# Patient Record
Sex: MALE | Race: WHITE | NOT HISPANIC OR LATINO | Employment: FULL TIME | URBAN - METROPOLITAN AREA
[De-identification: names, ages, dates, MRNs, and addresses within clinical notes are randomized per-mention and may not be internally consistent; named-entity substitution may affect disease eponyms.]

---

## 2017-02-06 ENCOUNTER — OFFICE VISIT (OUTPATIENT)
Dept: INTERNAL MEDICINE | Facility: CLINIC | Age: 37
End: 2017-02-06
Payer: COMMERCIAL

## 2017-02-06 VITALS
OXYGEN SATURATION: 99 % | TEMPERATURE: 98 F | DIASTOLIC BLOOD PRESSURE: 90 MMHG | HEIGHT: 76 IN | BODY MASS INDEX: 27.19 KG/M2 | SYSTOLIC BLOOD PRESSURE: 118 MMHG | HEART RATE: 70 BPM | WEIGHT: 223.31 LBS

## 2017-02-06 DIAGNOSIS — R25.3 FASCICULATIONS: ICD-10-CM

## 2017-02-06 DIAGNOSIS — B34.9 ACUTE VIRAL SYNDROME: Primary | ICD-10-CM

## 2017-02-06 PROCEDURE — 99213 OFFICE O/P EST LOW 20 MIN: CPT | Mod: S$GLB,,, | Performed by: INTERNAL MEDICINE

## 2017-02-06 PROCEDURE — 99999 PR PBB SHADOW E&M-EST. PATIENT-LVL III: CPT | Mod: PBBFAC,,, | Performed by: INTERNAL MEDICINE

## 2017-02-06 NOTE — MR AVS SNAPSHOT
"    [facility]  [address]  [facility] [address]  Phone: [phone]  Fax: [phone]                  Kurt Stoll   2017 1:20 PM   Office Visit    Description:  Male : 1980   Provider:  Deloris Christian MD   Department:  [facility]           Reason for Visit     Cough     Muscle Pain           Diagnoses this Visit        Comments    Acute viral syndrome    -  Primary            To Do List           Goals (5 Years of Data)     None      [facility]     [facility] On Call Nurse Care Line -  Assistance  Registered nurses in the [facility] On Call Center provide clinical advisement, health education, appointment booking, and other advisory services.  Call for this free service at [phone].             Medications           STOP taking these medications     propranolol (INDERAL) 10 MG tablet Take 1 tablet (10 mg total) by mouth 2 (two) times daily as needed.           Verify that the below list of medications is an accurate representation of the medications you are currently taking.  If none reported, the list may be blank. If incorrect, please contact your healthcare provider. Carry this list with you in case of emergency.           Current Medications            Clinical Reference Information           Your Vitals Were     BP Pulse Temp Height Weight SpO2    118/90 (BP Location: Right arm, Patient Position: Sitting, BP Method: Manual) 70 98 °F (36.7 °C) (Oral) 6' 4" (1.93 m) 101.3 kg (223 lb 5.2 oz) 99%    BMI                27.18 kg/m2          Blood Pressure          Most Recent Value    BP  (!)  118/90      Allergies as of 2017     No Known Allergies      Immunizations Administered on Date of Encounter - 2017     None      Instructions    Ibuprofen  600 mg 3 times a day with food.  Stay well hydrated.    Afrin nasal spray 3 days on, 3 days off.           Language Assistance Services     ATTENTION: Language assistance services are " available, free of charge. Please call [phone].      ATENCIÓN: Si habla español, tiene a lara disposición servicios gratuitos de asistencia lingüística. Llame al [phone].     CHÚ Ý: N?u b?n nói Ti?ng Vi?t, có các d?ch v? h? tr? ngôn ng? mi?n phí dành cho b?n. G?i s? [phone].         [facility] complies with applicable Federal civil rights laws and does not discriminate on the basis of race, color, national origin, age, disability, or sex.

## 2017-02-06 NOTE — PATIENT INSTRUCTIONS
Ibuprofen  600 mg 3 times a day with food.  Stay well hydrated.    Afrin nasal spray 3 days on, 3 days off.

## 2017-02-06 NOTE — PROGRESS NOTES
Subjective:       Patient ID: Kurt Stoll is a 36 y.o. male.    Chief Complaint: Cough (x 3 weeks yellow mucus ) and Muscle Pain (x 4 days 6/10)    HPI   C/o severe nasal congestion x 2 weeks, a new finding.   Mostly clear drainage. Lots of sneezing.  Eyes itchy initially.  Now throat sore.  Muscles super sore x 3-4 days.  No fever.  Very difficult to breathe at night.  Not much drip.  Mild cough.  No wheezing.  Also c/o lower lid twitching.  Yesterday has a rash over upper abdomen, now resolved.  Palate is sore; lump painful.   Mild diarrhea.  3 days ago had sharp groin pain, now better.  No dysuria.  No headache. Mental dullness.  Played Lacrosse yesterday despite having muscle pain.  No change in urine color..      Concerned b/c he has 2 small children and wife is about to deliver.  Review of Systems   Constitutional: Negative for activity change and unexpected weight change.   HENT: Positive for sneezing. Negative for sore throat and tinnitus.    Respiratory: Negative for chest tightness and shortness of breath.    Cardiovascular: Negative for chest pain and leg swelling.   Gastrointestinal: Negative for abdominal pain.   Musculoskeletal: Positive for myalgias.   Neurological: Negative for weakness and headaches.   Psychiatric/Behavioral: Negative for dysphoric mood.       Objective:      Physical Exam   Constitutional: He is oriented to person, place, and time. He appears well-developed and well-nourished.   HENT:   Right Ear: External ear normal.   Left Ear: External ear normal.   Mouth/Throat: No oropharyngeal exudate.   Tm's clear.  2 small papules on midline of proximal palate   Eyes: Right eye exhibits no discharge. Left eye exhibits no discharge.   Neck: Normal range of motion. Neck supple.   Cardiovascular: Normal rate and regular rhythm.    Pulmonary/Chest: Effort normal and breath sounds normal. No respiratory distress. He has no wheezes. He has no rales.   Musculoskeletal: He exhibits tenderness  (mild UE tenderness).   Lymphadenopathy:     He has no cervical adenopathy.   Neurological: He is alert and oriented to person, place, and time.   Skin:   Splotchy rash upper abd.   Psychiatric: He has a normal mood and affect. His behavior is normal.       Assessment:       1. Acute viral syndrome    2. Fasciculations of eyelid     3.    Likely preceding AR.         Plan:       Kurt was seen today for cough and muscle pain.    Diagnoses and all orders for this visit:    Acute viral syndrome    Fasciculations       See patient instructions      Call if any worsening.

## 2017-12-06 ENCOUNTER — OFFICE VISIT (OUTPATIENT)
Dept: INTERNAL MEDICINE | Facility: CLINIC | Age: 37
End: 2017-12-06
Attending: FAMILY MEDICINE
Payer: COMMERCIAL

## 2017-12-06 ENCOUNTER — LAB VISIT (OUTPATIENT)
Dept: LAB | Facility: HOSPITAL | Age: 37
End: 2017-12-06
Attending: FAMILY MEDICINE
Payer: COMMERCIAL

## 2017-12-06 VITALS
DIASTOLIC BLOOD PRESSURE: 82 MMHG | WEIGHT: 227.63 LBS | BODY MASS INDEX: 27.72 KG/M2 | SYSTOLIC BLOOD PRESSURE: 121 MMHG | HEIGHT: 76 IN

## 2017-12-06 DIAGNOSIS — R20.0 NUMBNESS AND TINGLING: ICD-10-CM

## 2017-12-06 DIAGNOSIS — R25.3 FASCICULATION: ICD-10-CM

## 2017-12-06 DIAGNOSIS — M79.673 PAIN OF FOOT, UNSPECIFIED LATERALITY: Primary | ICD-10-CM

## 2017-12-06 DIAGNOSIS — R20.2 NUMBNESS AND TINGLING: ICD-10-CM

## 2017-12-06 LAB
ANION GAP SERPL CALC-SCNC: 8 MMOL/L
BUN SERPL-MCNC: 17 MG/DL
CALCIUM SERPL-MCNC: 9.4 MG/DL
CHLORIDE SERPL-SCNC: 103 MMOL/L
CK SERPL-CCNC: 489 U/L
CO2 SERPL-SCNC: 27 MMOL/L
CREAT SERPL-MCNC: 1.3 MG/DL
ERYTHROCYTE [SEDIMENTATION RATE] IN BLOOD BY WESTERGREN METHOD: 2 MM/HR
EST. GFR  (AFRICAN AMERICAN): >60 ML/MIN/1.73 M^2
EST. GFR  (NON AFRICAN AMERICAN): >60 ML/MIN/1.73 M^2
GLUCOSE SERPL-MCNC: 95 MG/DL
POTASSIUM SERPL-SCNC: 4.1 MMOL/L
SODIUM SERPL-SCNC: 138 MMOL/L
TSH SERPL DL<=0.005 MIU/L-ACNC: 2.03 UIU/ML

## 2017-12-06 PROCEDURE — 99999 PR PBB SHADOW E&M-EST. PATIENT-LVL III: CPT | Mod: PBBFAC,,, | Performed by: FAMILY MEDICINE

## 2017-12-06 PROCEDURE — 82550 ASSAY OF CK (CPK): CPT

## 2017-12-06 PROCEDURE — 99214 OFFICE O/P EST MOD 30 MIN: CPT | Mod: S$GLB,,, | Performed by: FAMILY MEDICINE

## 2017-12-06 PROCEDURE — 36415 COLL VENOUS BLD VENIPUNCTURE: CPT

## 2017-12-06 PROCEDURE — 84443 ASSAY THYROID STIM HORMONE: CPT

## 2017-12-06 PROCEDURE — 80048 BASIC METABOLIC PNL TOTAL CA: CPT

## 2017-12-06 PROCEDURE — 85651 RBC SED RATE NONAUTOMATED: CPT

## 2017-12-06 NOTE — PROGRESS NOTES
Subjective:       Patient ID: Kurt Stoll is a 37 y.o. male.    Chief Complaint: Foot Pain    HPI  Review of Systems   Constitutional: Negative for activity change, chills, fatigue and fever.   HENT: Negative for congestion and trouble swallowing.    Eyes: Negative for discharge and redness.   Respiratory: Negative for cough, chest tightness, shortness of breath and wheezing.    Cardiovascular: Negative for chest pain, palpitations and leg swelling.   Gastrointestinal: Negative for abdominal pain, blood in stool, constipation, diarrhea and vomiting.   Genitourinary: Negative for difficulty urinating and hematuria.   Musculoskeletal: Positive for arthralgias. Negative for back pain, gait problem, joint swelling, myalgias and neck pain.   Skin: Negative for color change and rash.   Neurological: Positive for numbness. Negative for tremors, speech difficulty, weakness and headaches.   Hematological: Negative for adenopathy. Does not bruise/bleed easily.   Psychiatric/Behavioral: Negative for behavioral problems, confusion, dysphoric mood and sleep disturbance. The patient is not nervous/anxious.        Objective:      Physical Exam   Constitutional: He is oriented to person, place, and time. He appears well-developed and well-nourished. No distress.   Eyes: No scleral icterus.   Neck: Normal range of motion. Neck supple. Carotid bruit is not present.   Cardiovascular: Normal rate, regular rhythm, normal heart sounds and intact distal pulses.  Exam reveals no gallop and no friction rub.    No murmur heard.  Pulmonary/Chest: Effort normal and breath sounds normal. No respiratory distress. He has no wheezes. He has no rales.   Abdominal: Soft. Bowel sounds are normal. He exhibits no distension and no mass. There is no tenderness. There is no guarding.   Musculoskeletal: He exhibits no edema.        Right lower leg: He exhibits no edema.        Left lower leg: He exhibits no edema.   Neurological: He is alert and  oriented to person, place, and time. He displays no tremor. No cranial nerve deficit. Coordination and gait normal.   Skin: Skin is warm and dry. No rash noted. He is not diaphoretic. No erythema.   Psychiatric: He has a normal mood and affect. His behavior is normal. Judgment and thought content normal.   Nursing note and vitals reviewed.      Assessment:       1. Pain of foot, unspecified laterality    2. Numbness and tingling    3. Fasciculation        Plan:   Kurt was seen today for foot pain.    Diagnoses and all orders for this visit:    Pain of foot, unspecified laterality    Numbness and tingling    Fasciculation  -     Ambulatory referral to Neurology  -     TSH; Future  -     Basic metabolic panel; Future  -     CK; Future  -     Sedimentation rate, manual; Future      See meds, orders, follow up, routing and instructions sections of encounter.  A 37-year-old established male patient.  He is in for foot pain in multiple   locations, migratory.  We did discuss running shoes.  He uses primitive type   foot wear and has transitioned to that running technique over the past five   years or so.  He runs three miles, three times weekly.  He also describes a   dysesthesia in the first interspace on the left, not described as a distinct   numbness more of sensation of something being between the toes.    I looked back at his old record.  He had complained of some fasciculations in   the past.  He still has those, although the myalgia or cramping has improved.    He had a mildly elevated CK level in 2016, has not followed up or had   significant complaints since then.    ADDENDUM TO PHYSICAL EXAMINATION:  His foot examination is essentially normal.    He may have had some dysesthesia to pinprick in the first interspace to the left   foot.  His foot architecture was generally normal and did not notice any issues   with range of motion, redness, callus formation, etc.  No vascular issues.    RECOMMENDATIONS:  Foot  stretching, calf stretching, trial of the metatarsal   wafer.    See additional laboratory and orders and Neurology consult concerning a   persistent fasciculation.      LASHAWN/HN  dd: 12/06/2017 16:12:26 (CST)  td: 12/07/2017 10:42:28 (CST)  Doc ID   #[id number]  Job ID #[id number]    CC:

## 2017-12-07 ENCOUNTER — PATIENT MESSAGE (OUTPATIENT)
Dept: INTERNAL MEDICINE | Facility: CLINIC | Age: 37
End: 2017-12-07

## 2017-12-11 ENCOUNTER — OFFICE VISIT (OUTPATIENT)
Dept: NEUROLOGY | Facility: CLINIC | Age: 37
End: 2017-12-11
Payer: COMMERCIAL

## 2017-12-11 VITALS
DIASTOLIC BLOOD PRESSURE: 90 MMHG | BODY MASS INDEX: 28.08 KG/M2 | HEIGHT: 76 IN | SYSTOLIC BLOOD PRESSURE: 137 MMHG | HEART RATE: 55 BPM | WEIGHT: 230.63 LBS

## 2017-12-11 DIAGNOSIS — R25.3 MUSCLE TWITCH: Primary | ICD-10-CM

## 2017-12-11 DIAGNOSIS — R74.8 ELEVATED CPK: ICD-10-CM

## 2017-12-11 PROCEDURE — 99205 OFFICE O/P NEW HI 60 MIN: CPT | Mod: S$GLB,,, | Performed by: NEUROMUSCULOSKELETAL MEDICINE & OMM

## 2017-12-11 PROCEDURE — 99999 PR PBB SHADOW E&M-EST. PATIENT-LVL II: CPT | Mod: PBBFAC,,, | Performed by: NEUROMUSCULOSKELETAL MEDICINE & OMM

## 2017-12-12 PROBLEM — R25.3 MUSCLE TWITCH: Status: ACTIVE | Noted: 2017-12-12

## 2017-12-12 PROBLEM — R74.8 ELEVATED CPK: Status: ACTIVE | Noted: 2017-12-12

## 2017-12-12 NOTE — PROGRESS NOTES
Kurt HIPOLITO Jerman  1980  Review of patient's allergies indicates:  No Known Allergies  [unfilled]    No past medical history on file.  Social History     Social History    Marital status:      Spouse name: N/A    Number of children: 1.5    Years of education: N/A     Occupational History     [employer]     Social History Main Topics    Smoking status: Former Smoker     Quit date: 12/5/2009    Smokeless tobacco: Not on file    Alcohol use Yes      Comment: 17 drinks/weekend:  wine and liquor.    Drug use: Unknown    Sexual activity: Not on file     Other Topics Concern    Not on file     Social History Narrative    No narrative on file     Family History   Problem Relation Age of Onset    Aneurysm Mother     Heart disease Paternal Grandfather        Review of systems:  Constitutional-negative  Eyes-negative  ENT, mouth-negative  Cardiovascular-negative  Respiratory-negative  GI-negative  - negative  Musculoskeletal-negative  Skin-negative  Neurologic-negative  Psychiatric-negative  Endocrine-negative  Hematology/lymph nodes-negative  Allergies/immunology-negative  Baileeblanche MCMILLAN Jerman  1980  Review of patient's allergies indicates:  No Known Allergies  [unfilled]    No past medical history on file.  Social History     Social History    Marital status:      Spouse name: N/A    Number of children: 1.5    Years of education: N/A     Occupational History     [employer]     Social History Main Topics    Smoking status: Former Smoker     Quit date: 12/5/2009    Smokeless tobacco: Not on file    Alcohol use Yes      Comment: 17 drinks/weekend:  wine and liquor.    Drug use: Unknown    Sexual activity: Not on file     Other Topics Concern    Not on file     Social History Narrative    No narrative on file     Family History   Problem Relation Age of Onset    Aneurysm  Mother     Heart disease Paternal Grandfather        Review of systems:  Constitutional-negative  Eyes-negative  ENT, mouth-negative  Cardiovascular-negative  Respiratory-negative  GI-negative  - negative  Musculoskeletal-negative  Skin-negative  Neurologic-negative  Psychiatric-negative  Endocrine-negative  Hematology/lymph nodes-negative  Allergies/immunology-negative  Gen. Appearance: Well-developed with no obvious deformities  Carotid arteries symmetrical pulses  Peripheral vascular shows symmetrical pulses with no obvious edema or tenderness  Social History : Patient is a   Present history:   This is a 37-year-old white male who presents with complaints of muscle twitching over the last 6-7 years.  He has never seen a doctor for that problem until recently it became more prominent in the calf muscles in the feet and occasionally into the arms.  It now seems to occur all day long often on.  His primary care referred him to neurology since his CPKs have been running in the 500s.  Patient exercises regularly and typically runs 3 miles 3 times per week in addition to doing exercises on a regular basis.  He does do some weight lifting but low weights and frequent repetitions.  He denies any double vision, swallowing problems, breathing problems, or other focal neurologic symptoms.  He denies any specific weakness.  He does get some numbness and tingling in the feet on occasion.    Neurological Exam:  Mental status-alert and oriented to person, place, and time; attention span and concentration is good. Fund of knowledge-patient is aware of current events and able to give detailed history of the current problem.recent and remote memory seems intact. Language function is normal with no evidence of aphasia  Cranial nerves:Visual acuity to hand chart -normal; visual fields to confrontation normal;pupils were equal and reactive to light ;no evidence of ptosis ;  funduscopic examination was normal with  sharp disc margins. external ocular movements were full with no nystagmus. Facial sensation to pinprick : normal ; corneal reflexes intact; Facial muscles were symmetrical. Hearing is unimpaired symmetrical finger rub; Tongue movements - normal ; palate movements - normal ;Swallowing unimpaired. Shoulder shrug was intact with good strength Speech was normal  Motor examination: Upper : normal                                      Lower extremities - Normal;muscle tone was normal ;                  Right-handed  Sensory examination:   Upper; normal pinprick and soft touch ;   Lower extremities - normal and symmetrical.   Vibration sense: Right: 15-20 seconds/Left: less than 10 seconds @ toes  Deep tendon reflexes: upper extremities :1-2+ symmetrical ;     lower extremities KJ- to-3 +; AJ - 2+ left positive Babinski  Cerebellar examination upper: Normal finger to nose and rapid alternating movements  Gait: Steady with no ataxia;      heel and toe walk normal  Romberg test: negative       Tandem gait: Normal    Involuntary movements: Negative  TMJ - no tenderness  Cervical examination: Full range of motion with no pain Cervical tenderness :negative  Lumbar examination: Low back tenderness-negative                  Sciatic notchtenderness-negative            Straight leg raising : negative    Impression: Muscle twitching; mildly elevated CPK; left positive Babinskis; left impaired vibratory sensation at the toes    Recommendations/Plan : Long discussion with the patient in reference to differential diagnosis of muscle twitching with an abnormal neurologic exam.  We discussed the unlikelihood of ALS 7 year history of sensory involvement.  Demyelinating disease would definitely be in the differential however need to rule out neuromuscular disease in the presence of the muscle twitching and elevated CPK.  EMG nerve conduction study lower extremities

## 2017-12-13 ENCOUNTER — TELEPHONE (OUTPATIENT)
Dept: NEUROLOGY | Facility: CLINIC | Age: 37
End: 2017-12-13

## 2017-12-13 NOTE — TELEPHONE ENCOUNTER
----- Message from Hardik ERWIN Jayjay sent at 12/13/2017  9:10 AM CST -----  Contact: Self @ [phone]  Pt is asking to speak w/ the nurse to reschedule EMG on 12/15 @ 11:20 AM to 8:40 Am. Pls call.      I have spoken with this patient and was able to schedule his appointment at the time he has requested

## 2017-12-15 ENCOUNTER — PROCEDURE VISIT (OUTPATIENT)
Dept: NEUROLOGY | Facility: CLINIC | Age: 37
End: 2017-12-15
Payer: COMMERCIAL

## 2017-12-15 DIAGNOSIS — R25.3 MUSCLE TWITCH: ICD-10-CM

## 2017-12-15 PROCEDURE — 95909 NRV CNDJ TST 5-6 STUDIES: CPT | Mod: S$GLB,,, | Performed by: NEUROMUSCULOSKELETAL MEDICINE & OMM

## 2017-12-15 PROCEDURE — 95886 MUSC TEST DONE W/N TEST COMP: CPT | Mod: S$GLB,,, | Performed by: NEUROMUSCULOSKELETAL MEDICINE & OMM

## 2018-10-25 ENCOUNTER — PATIENT MESSAGE (OUTPATIENT)
Dept: INTERNAL MEDICINE | Facility: CLINIC | Age: 38
End: 2018-10-25

## 2020-09-17 ENCOUNTER — LAB VISIT (OUTPATIENT)
Dept: LAB | Facility: HOSPITAL | Age: 40
End: 2020-09-17
Attending: FAMILY MEDICINE
Payer: COMMERCIAL

## 2020-09-17 ENCOUNTER — IMMUNIZATION (OUTPATIENT)
Dept: PHARMACY | Facility: CLINIC | Age: 40
End: 2020-09-17

## 2020-09-17 ENCOUNTER — IMMUNIZATION (OUTPATIENT)
Dept: PHARMACY | Facility: CLINIC | Age: 40
End: 2020-09-17
Payer: COMMERCIAL

## 2020-09-17 ENCOUNTER — OFFICE VISIT (OUTPATIENT)
Dept: INTERNAL MEDICINE | Facility: CLINIC | Age: 40
End: 2020-09-17
Attending: FAMILY MEDICINE
Payer: COMMERCIAL

## 2020-09-17 VITALS
SYSTOLIC BLOOD PRESSURE: 112 MMHG | WEIGHT: 229.25 LBS | HEART RATE: 68 BPM | HEIGHT: 76 IN | OXYGEN SATURATION: 99 % | BODY MASS INDEX: 27.92 KG/M2 | DIASTOLIC BLOOD PRESSURE: 82 MMHG

## 2020-09-17 DIAGNOSIS — R74.8 ELEVATED CPK: ICD-10-CM

## 2020-09-17 DIAGNOSIS — R25.3 MUSCLE TWITCH: ICD-10-CM

## 2020-09-17 DIAGNOSIS — Z00.00 ANNUAL PHYSICAL EXAM: Primary | ICD-10-CM

## 2020-09-17 DIAGNOSIS — Z00.00 ANNUAL PHYSICAL EXAM: ICD-10-CM

## 2020-09-17 LAB
ALBUMIN SERPL BCP-MCNC: 4.4 G/DL (ref 3.5–5.2)
ALP SERPL-CCNC: 41 U/L (ref 55–135)
ALT SERPL W/O P-5'-P-CCNC: 23 U/L (ref 10–44)
ANION GAP SERPL CALC-SCNC: 9 MMOL/L (ref 8–16)
AST SERPL-CCNC: 35 U/L (ref 10–40)
BILIRUB SERPL-MCNC: 0.5 MG/DL (ref 0.1–1)
BUN SERPL-MCNC: 12 MG/DL (ref 6–20)
CALCIUM SERPL-MCNC: 9.2 MG/DL (ref 8.7–10.5)
CHLORIDE SERPL-SCNC: 104 MMOL/L (ref 95–110)
CHOLEST SERPL-MCNC: 149 MG/DL (ref 120–199)
CHOLEST/HDLC SERPL: 2.7 {RATIO} (ref 2–5)
CK SERPL-CCNC: 416 U/L (ref 20–200)
CO2 SERPL-SCNC: 27 MMOL/L (ref 23–29)
CREAT SERPL-MCNC: 1.2 MG/DL (ref 0.5–1.4)
EST. GFR  (AFRICAN AMERICAN): >60 ML/MIN/1.73 M^2
EST. GFR  (NON AFRICAN AMERICAN): >60 ML/MIN/1.73 M^2
GLUCOSE SERPL-MCNC: 93 MG/DL (ref 70–110)
HDLC SERPL-MCNC: 56 MG/DL (ref 40–75)
HDLC SERPL: 37.6 % (ref 20–50)
LDLC SERPL CALC-MCNC: 79.2 MG/DL (ref 63–159)
NONHDLC SERPL-MCNC: 93 MG/DL
POTASSIUM SERPL-SCNC: 3.9 MMOL/L (ref 3.5–5.1)
PROT SERPL-MCNC: 7.3 G/DL (ref 6–8.4)
SARS-COV-2 IGG SERPLBLD QL IA.RAPID: NEGATIVE
SODIUM SERPL-SCNC: 140 MMOL/L (ref 136–145)
TRIGL SERPL-MCNC: 69 MG/DL (ref 30–150)

## 2020-09-17 PROCEDURE — 86769 SARS-COV-2 COVID-19 ANTIBODY: CPT

## 2020-09-17 PROCEDURE — 82550 ASSAY OF CK (CPK): CPT

## 2020-09-17 PROCEDURE — 36415 COLL VENOUS BLD VENIPUNCTURE: CPT

## 2020-09-17 PROCEDURE — 99999 PR PBB SHADOW E&M-EST. PATIENT-LVL III: ICD-10-PCS | Mod: PBBFAC,,, | Performed by: FAMILY MEDICINE

## 2020-09-17 PROCEDURE — 99999 PR PBB SHADOW E&M-EST. PATIENT-LVL III: CPT | Mod: PBBFAC,,, | Performed by: FAMILY MEDICINE

## 2020-09-17 PROCEDURE — 80061 LIPID PANEL: CPT

## 2020-09-17 PROCEDURE — 80053 COMPREHEN METABOLIC PANEL: CPT

## 2020-09-17 PROCEDURE — 99395 PREV VISIT EST AGE 18-39: CPT | Mod: S$GLB,,, | Performed by: FAMILY MEDICINE

## 2020-09-17 PROCEDURE — 99395 PR PREVENTIVE VISIT,EST,18-39: ICD-10-PCS | Mod: S$GLB,,, | Performed by: FAMILY MEDICINE

## 2020-09-17 NOTE — PATIENT INSTRUCTIONS
Flu shot today    TDAP vaccine today    Schedule lab orders for today.     nformation about cholesterol, high blood pressure and healthy diet and activity recommendations can be found at the following links on the Internet:    http://www.nhlbi.nih.gov/health/health-topics/topics/hbc  http://www.nhlbi.nih.gov/health/educational/lose_wt/index.htm  Http://www.nhlbi.nih.gov/files/docs/public/heart/hbp_low.pdf  http://www.heart.org/HEARTORG/  http://diabetes.org/  https://www.cdc.gov/  Https://healthfinder.gov/  https://health.gov/dietaryguidelines/2015/guidelines/  https://health.gov/paguidelines/second-edition/pdf/Physical_Activity_Guidelines_2nd_edition.pdf

## 2020-09-17 NOTE — PROGRESS NOTES
Subjective:       Patient ID: Kurt Stoll is a 39 y.o. male.    Chief Complaint: Annual Exam    Established patient for an annual wellness check/physical exam and also chronic disease management. Specific complaints - see dictation and please see ROS.  P, S, Fm, Soc Hx's; Meds, allergies reviewed and reconciled.  Health maintenance file reviewed and addressed items due.    No progression of twitching. No aches and no weakness.    Review of Systems   Constitutional: Negative for activity change, chills, fatigue, fever and unexpected weight change.   HENT: Negative for congestion, hearing loss, rhinorrhea and trouble swallowing.    Eyes: Negative for discharge, redness and visual disturbance.   Respiratory: Negative for cough, chest tightness, shortness of breath and wheezing.    Cardiovascular: Negative for chest pain, palpitations and leg swelling.   Gastrointestinal: Negative for abdominal pain, blood in stool, constipation, diarrhea and vomiting.   Endocrine: Negative for polydipsia and polyuria.   Genitourinary: Negative for difficulty urinating, hematuria and urgency.   Musculoskeletal: Negative for arthralgias, back pain, gait problem, joint swelling, myalgias and neck pain.   Skin: Negative for color change and rash.   Neurological: Negative for tremors, speech difficulty, weakness, numbness and headaches.   Hematological: Negative for adenopathy. Does not bruise/bleed easily.   Psychiatric/Behavioral: Negative for behavioral problems, confusion, dysphoric mood and sleep disturbance. The patient is not nervous/anxious.        Objective:      Physical Exam  Vitals signs and nursing note reviewed.   Constitutional:       Appearance: He is well-developed. He is not diaphoretic.   Eyes:      General: No scleral icterus.  Neck:      Musculoskeletal: Normal range of motion and neck supple.      Thyroid: No thyromegaly.      Vascular: No carotid bruit or JVD.      Trachea: No tracheal deviation.   Cardiovascular:       Rate and Rhythm: Normal rate and regular rhythm.      Heart sounds: Normal heart sounds. No murmur. No friction rub. No gallop.    Pulmonary:      Effort: Pulmonary effort is normal. No respiratory distress.      Breath sounds: Normal breath sounds. No wheezing or rales.   Abdominal:      General: There is no distension.      Palpations: Abdomen is soft. There is no mass.      Tenderness: There is no abdominal tenderness. There is no guarding or rebound.   Lymphadenopathy:      Cervical: No cervical adenopathy.   Skin:     General: Skin is warm and dry.      Findings: No erythema or rash.   Neurological:      Mental Status: He is alert and oriented to person, place, and time.      Cranial Nerves: No cranial nerve deficit.      Motor: No tremor.      Coordination: Coordination normal.      Gait: Gait normal.   Psychiatric:         Behavior: Behavior normal.         Thought Content: Thought content normal.         Judgment: Judgment normal.         Assessment:       1. Annual physical exam    2. Muscle twitch    3. Elevated CPK        Plan:     Kurt was seen today for annual exam.    Diagnoses and all orders for this visit:    Annual physical exam  -     Comprehensive metabolic panel; Future  -     Lipid Panel; Future  -     CK; Future    Muscle twitch  Comments:  w/u by neurology in 2017, no dx, no progression, does not want to pursue    Elevated CPK  Comments:  w/u by neurology in 2017, no dx, no progression, does not want to pursue. does lift weights.  Orders:  -     CK; Future    Other orders  -     COVID-19 (SARS CoV-2) IgG Antibody; Future      See meds, orders, follow up, routing and instructions sections of encounter and AVS. Discussed with patient and provided on AVS.

## 2020-11-03 ENCOUNTER — PATIENT MESSAGE (OUTPATIENT)
Dept: INTERNAL MEDICINE | Facility: CLINIC | Age: 40
End: 2020-11-03

## 2020-11-10 NOTE — TELEPHONE ENCOUNTER
From:Richi Fernandes       Sent:11/3/2020  4:56 PM CST         To:Kurt Stoll    Subject:RE: Test Results    .tlb      ----- Message -----       From:Kurt Stoll       Sent:11/3/2020  4:25 PM CST         To:Harshal Love MD    Subject:Test Results    Dr. Love,    I was reading online that there is a test that can determine what part of the body is responsible for elevated CPK results. It seems like you can tell if it is coming from you brain, your muscles, or your heart. Maybe the test I took can already do this?    Could you look into this a little and see if there is any value to trying to determine the source? I am a little worried because my mother  of an anurism and I do have family heart disease issues. I would feel more comfortable knowing where the problem is arising from, but I also don't know if what I was reading was legit. I think it was from a reputable source like the [facility] or something, but I don't remember offhand.    Thanks,  Kurt Stoll

## 2021-08-25 ENCOUNTER — OFFICE VISIT (OUTPATIENT)
Dept: INTERNAL MEDICINE | Facility: CLINIC | Age: 41
End: 2021-08-25
Payer: COMMERCIAL

## 2021-08-25 ENCOUNTER — LAB VISIT (OUTPATIENT)
Dept: LAB | Facility: HOSPITAL | Age: 41
End: 2021-08-25
Payer: COMMERCIAL

## 2021-08-25 VITALS
HEART RATE: 64 BPM | SYSTOLIC BLOOD PRESSURE: 112 MMHG | HEIGHT: 76 IN | WEIGHT: 234.13 LBS | DIASTOLIC BLOOD PRESSURE: 84 MMHG | BODY MASS INDEX: 28.51 KG/M2 | OXYGEN SATURATION: 98 %

## 2021-08-25 DIAGNOSIS — R74.8 ELEVATED CK: ICD-10-CM

## 2021-08-25 DIAGNOSIS — Z00.00 ENCOUNTER FOR HEALTH MAINTENANCE EXAMINATION IN ADULT: Primary | ICD-10-CM

## 2021-08-25 DIAGNOSIS — Z01.89 ROUTINE LAB DRAW: ICD-10-CM

## 2021-08-25 DIAGNOSIS — R25.2 MUSCLE CRAMPS: ICD-10-CM

## 2021-08-25 DIAGNOSIS — Z00.00 ENCOUNTER FOR HEALTH MAINTENANCE EXAMINATION IN ADULT: ICD-10-CM

## 2021-08-25 LAB
ALBUMIN SERPL BCP-MCNC: 4.3 G/DL (ref 3.5–5.2)
ALP SERPL-CCNC: 42 U/L (ref 55–135)
ALT SERPL W/O P-5'-P-CCNC: 33 U/L (ref 10–44)
ANION GAP SERPL CALC-SCNC: 11 MMOL/L (ref 8–16)
AST SERPL-CCNC: 40 U/L (ref 10–40)
BASOPHILS # BLD AUTO: 0.04 K/UL (ref 0–0.2)
BASOPHILS NFR BLD: 0.8 % (ref 0–1.9)
BILIRUB SERPL-MCNC: 0.6 MG/DL (ref 0.1–1)
BUN SERPL-MCNC: 15 MG/DL (ref 6–20)
CALCIUM SERPL-MCNC: 10.1 MG/DL (ref 8.7–10.5)
CHLORIDE SERPL-SCNC: 101 MMOL/L (ref 95–110)
CHOLEST SERPL-MCNC: 154 MG/DL (ref 120–199)
CHOLEST/HDLC SERPL: 2.8 {RATIO} (ref 2–5)
CK SERPL-CCNC: 487 U/L (ref 20–200)
CO2 SERPL-SCNC: 26 MMOL/L (ref 23–29)
CREAT SERPL-MCNC: 1.2 MG/DL (ref 0.5–1.4)
DIFFERENTIAL METHOD: ABNORMAL
EOSINOPHIL # BLD AUTO: 0.2 K/UL (ref 0–0.5)
EOSINOPHIL NFR BLD: 3.2 % (ref 0–8)
ERYTHROCYTE [DISTWIDTH] IN BLOOD BY AUTOMATED COUNT: 12.2 % (ref 11.5–14.5)
EST. GFR  (AFRICAN AMERICAN): >60 ML/MIN/1.73 M^2
EST. GFR  (NON AFRICAN AMERICAN): >60 ML/MIN/1.73 M^2
GLUCOSE SERPL-MCNC: 94 MG/DL (ref 70–110)
HCT VFR BLD AUTO: 43.7 % (ref 40–54)
HDLC SERPL-MCNC: 55 MG/DL (ref 40–75)
HDLC SERPL: 35.7 % (ref 20–50)
HGB BLD-MCNC: 15.1 G/DL (ref 14–18)
IMM GRANULOCYTES # BLD AUTO: 0.01 K/UL (ref 0–0.04)
IMM GRANULOCYTES NFR BLD AUTO: 0.2 % (ref 0–0.5)
LDLC SERPL CALC-MCNC: 82.8 MG/DL (ref 63–159)
LYMPHOCYTES # BLD AUTO: 1.5 K/UL (ref 1–4.8)
LYMPHOCYTES NFR BLD: 31.6 % (ref 18–48)
MCH RBC QN AUTO: 31.9 PG (ref 27–31)
MCHC RBC AUTO-ENTMCNC: 34.6 G/DL (ref 32–36)
MCV RBC AUTO: 92 FL (ref 82–98)
MONOCYTES # BLD AUTO: 0.6 K/UL (ref 0.3–1)
MONOCYTES NFR BLD: 11.8 % (ref 4–15)
NEUTROPHILS # BLD AUTO: 2.5 K/UL (ref 1.8–7.7)
NEUTROPHILS NFR BLD: 52.4 % (ref 38–73)
NONHDLC SERPL-MCNC: 99 MG/DL
NRBC BLD-RTO: 0 /100 WBC
PLATELET # BLD AUTO: 228 K/UL (ref 150–450)
PMV BLD AUTO: 11 FL (ref 9.2–12.9)
POTASSIUM SERPL-SCNC: 4.1 MMOL/L (ref 3.5–5.1)
PROT SERPL-MCNC: 7.3 G/DL (ref 6–8.4)
RBC # BLD AUTO: 4.74 M/UL (ref 4.6–6.2)
SODIUM SERPL-SCNC: 138 MMOL/L (ref 136–145)
TRIGL SERPL-MCNC: 81 MG/DL (ref 30–150)
TSH SERPL DL<=0.005 MIU/L-ACNC: 2.44 UIU/ML (ref 0.4–4)
WBC # BLD AUTO: 4.74 K/UL (ref 3.9–12.7)

## 2021-08-25 PROCEDURE — 1159F MED LIST DOCD IN RCRD: CPT | Mod: CPTII,S$GLB,, | Performed by: NURSE PRACTITIONER

## 2021-08-25 PROCEDURE — 3079F DIAST BP 80-89 MM HG: CPT | Mod: CPTII,S$GLB,, | Performed by: NURSE PRACTITIONER

## 2021-08-25 PROCEDURE — 85025 COMPLETE CBC W/AUTO DIFF WBC: CPT | Performed by: NURSE PRACTITIONER

## 2021-08-25 PROCEDURE — 99395 PREV VISIT EST AGE 18-39: CPT | Mod: S$GLB,,, | Performed by: NURSE PRACTITIONER

## 2021-08-25 PROCEDURE — 3079F PR MOST RECENT DIASTOLIC BLOOD PRESSURE 80-89 MM HG: ICD-10-PCS | Mod: CPTII,S$GLB,, | Performed by: NURSE PRACTITIONER

## 2021-08-25 PROCEDURE — 1160F RVW MEDS BY RX/DR IN RCRD: CPT | Mod: CPTII,S$GLB,, | Performed by: NURSE PRACTITIONER

## 2021-08-25 PROCEDURE — 1126F AMNT PAIN NOTED NONE PRSNT: CPT | Mod: CPTII,S$GLB,, | Performed by: NURSE PRACTITIONER

## 2021-08-25 PROCEDURE — 1160F PR REVIEW ALL MEDS BY PRESCRIBER/CLIN PHARMACIST DOCUMENTED: ICD-10-PCS | Mod: CPTII,S$GLB,, | Performed by: NURSE PRACTITIONER

## 2021-08-25 PROCEDURE — 80053 COMPREHEN METABOLIC PANEL: CPT | Performed by: NURSE PRACTITIONER

## 2021-08-25 PROCEDURE — 82550 ASSAY OF CK (CPK): CPT | Performed by: NURSE PRACTITIONER

## 2021-08-25 PROCEDURE — 99999 PR PBB SHADOW E&M-EST. PATIENT-LVL III: ICD-10-PCS | Mod: PBBFAC,,, | Performed by: NURSE PRACTITIONER

## 2021-08-25 PROCEDURE — 99395 PR PREVENTIVE VISIT,EST,18-39: ICD-10-PCS | Mod: S$GLB,,, | Performed by: NURSE PRACTITIONER

## 2021-08-25 PROCEDURE — 3074F PR MOST RECENT SYSTOLIC BLOOD PRESSURE < 130 MM HG: ICD-10-PCS | Mod: CPTII,S$GLB,, | Performed by: NURSE PRACTITIONER

## 2021-08-25 PROCEDURE — 99999 PR PBB SHADOW E&M-EST. PATIENT-LVL III: CPT | Mod: PBBFAC,,, | Performed by: NURSE PRACTITIONER

## 2021-08-25 PROCEDURE — 3008F PR BODY MASS INDEX (BMI) DOCUMENTED: ICD-10-PCS | Mod: CPTII,S$GLB,, | Performed by: NURSE PRACTITIONER

## 2021-08-25 PROCEDURE — 1126F PR PAIN SEVERITY QUANTIFIED, NO PAIN PRESENT: ICD-10-PCS | Mod: CPTII,S$GLB,, | Performed by: NURSE PRACTITIONER

## 2021-08-25 PROCEDURE — 3008F BODY MASS INDEX DOCD: CPT | Mod: CPTII,S$GLB,, | Performed by: NURSE PRACTITIONER

## 2021-08-25 PROCEDURE — 80061 LIPID PANEL: CPT | Performed by: NURSE PRACTITIONER

## 2021-08-25 PROCEDURE — 36415 COLL VENOUS BLD VENIPUNCTURE: CPT | Performed by: NURSE PRACTITIONER

## 2021-08-25 PROCEDURE — 3074F SYST BP LT 130 MM HG: CPT | Mod: CPTII,S$GLB,, | Performed by: NURSE PRACTITIONER

## 2021-08-25 PROCEDURE — 1159F PR MEDICATION LIST DOCUMENTED IN MEDICAL RECORD: ICD-10-PCS | Mod: CPTII,S$GLB,, | Performed by: NURSE PRACTITIONER

## 2021-08-25 PROCEDURE — 84443 ASSAY THYROID STIM HORMONE: CPT | Performed by: NURSE PRACTITIONER

## 2021-08-26 ENCOUNTER — TELEPHONE (OUTPATIENT)
Dept: INTERNAL MEDICINE | Facility: CLINIC | Age: 41
End: 2021-08-26

## 2021-08-26 DIAGNOSIS — R74.8 ELEVATED CPK: ICD-10-CM

## 2021-08-26 DIAGNOSIS — M62.838 MUSCLE SPASMS OF BOTH LOWER EXTREMITIES: ICD-10-CM

## 2021-09-10 ENCOUNTER — TELEPHONE (OUTPATIENT)
Dept: NEUROLOGY | Facility: CLINIC | Age: 41
End: 2021-09-10

## 2022-02-03 ENCOUNTER — PATIENT OUTREACH (OUTPATIENT)
Dept: ADMINISTRATIVE | Facility: OTHER | Age: 42
End: 2022-02-03
Payer: COMMERCIAL

## 2022-02-03 NOTE — PROGRESS NOTES
Health Maintenance Due   Topic Date Due    Hepatitis C Screening  Never done    HIV Screening  Never done    Influenza Vaccine (1) 09/01/2021    COVID-19 Vaccine (3 - Booster for Moderna series) 10/14/2021     Updates were requested from care everywhere.  Chart was reviewed for overdue Proactive [facility] Encounters ([facility]) topics (CRS, Breast Cancer Screening, Eye exam)  Health Maintenance has been updated.  LINKS immunization registry triggered.  Immunizations were reconciled.

## 2022-02-07 ENCOUNTER — OFFICE VISIT (OUTPATIENT)
Dept: NEUROLOGY | Facility: CLINIC | Age: 42
End: 2022-02-07
Payer: COMMERCIAL

## 2022-02-07 VITALS
BODY MASS INDEX: 28.51 KG/M2 | HEIGHT: 76 IN | WEIGHT: 234.13 LBS | HEART RATE: 61 BPM | SYSTOLIC BLOOD PRESSURE: 140 MMHG | DIASTOLIC BLOOD PRESSURE: 92 MMHG

## 2022-02-07 DIAGNOSIS — R74.8 ELEVATED CPK: ICD-10-CM

## 2022-02-07 DIAGNOSIS — R25.3 MUSCLE TWITCH: Primary | ICD-10-CM

## 2022-02-07 DIAGNOSIS — R53.1 WEAKNESS: ICD-10-CM

## 2022-02-07 PROCEDURE — 3008F BODY MASS INDEX DOCD: CPT | Mod: CPTII,S$GLB,, | Performed by: PSYCHIATRY & NEUROLOGY

## 2022-02-07 PROCEDURE — 1160F PR REVIEW ALL MEDS BY PRESCRIBER/CLIN PHARMACIST DOCUMENTED: ICD-10-PCS | Mod: CPTII,S$GLB,, | Performed by: PSYCHIATRY & NEUROLOGY

## 2022-02-07 PROCEDURE — 99999 PR PBB SHADOW E&M-EST. PATIENT-LVL III: CPT | Mod: PBBFAC,,, | Performed by: PSYCHIATRY & NEUROLOGY

## 2022-02-07 PROCEDURE — 99205 PR OFFICE/OUTPT VISIT, NEW, LEVL V, 60-74 MIN: ICD-10-PCS | Mod: S$GLB,,, | Performed by: PSYCHIATRY & NEUROLOGY

## 2022-02-07 PROCEDURE — 1160F RVW MEDS BY RX/DR IN RCRD: CPT | Mod: CPTII,S$GLB,, | Performed by: PSYCHIATRY & NEUROLOGY

## 2022-02-07 PROCEDURE — 3008F PR BODY MASS INDEX (BMI) DOCUMENTED: ICD-10-PCS | Mod: CPTII,S$GLB,, | Performed by: PSYCHIATRY & NEUROLOGY

## 2022-02-07 PROCEDURE — 3080F DIAST BP >= 90 MM HG: CPT | Mod: CPTII,S$GLB,, | Performed by: PSYCHIATRY & NEUROLOGY

## 2022-02-07 PROCEDURE — 99205 OFFICE O/P NEW HI 60 MIN: CPT | Mod: S$GLB,,, | Performed by: PSYCHIATRY & NEUROLOGY

## 2022-02-07 PROCEDURE — 3080F PR MOST RECENT DIASTOLIC BLOOD PRESSURE >= 90 MM HG: ICD-10-PCS | Mod: CPTII,S$GLB,, | Performed by: PSYCHIATRY & NEUROLOGY

## 2022-02-07 PROCEDURE — 1159F MED LIST DOCD IN RCRD: CPT | Mod: CPTII,S$GLB,, | Performed by: PSYCHIATRY & NEUROLOGY

## 2022-02-07 PROCEDURE — 3077F PR MOST RECENT SYSTOLIC BLOOD PRESSURE >= 140 MM HG: ICD-10-PCS | Mod: CPTII,S$GLB,, | Performed by: PSYCHIATRY & NEUROLOGY

## 2022-02-07 PROCEDURE — 3077F SYST BP >= 140 MM HG: CPT | Mod: CPTII,S$GLB,, | Performed by: PSYCHIATRY & NEUROLOGY

## 2022-02-07 PROCEDURE — 1159F PR MEDICATION LIST DOCUMENTED IN MEDICAL RECORD: ICD-10-PCS | Mod: CPTII,S$GLB,, | Performed by: PSYCHIATRY & NEUROLOGY

## 2022-02-07 PROCEDURE — 99999 PR PBB SHADOW E&M-EST. PATIENT-LVL III: ICD-10-PCS | Mod: PBBFAC,,, | Performed by: PSYCHIATRY & NEUROLOGY

## 2022-02-07 RX ORDER — LORATADINE 10 MG/1
10 TABLET ORAL DAILY
COMMUNITY

## 2022-02-07 NOTE — PROGRESS NOTES
[facility] NEUROLOGY  [facility], [place]    Date: 2/7/22  Patient Name: Kurt Stoll   MRN: [id number]   PCP: Harshal Love  Referring Provider: Self, Aaareferral    Chief Complaint:  Elevated CK  Subjective:   This patient has been evaluated by [facility] neurology in the past.  Extensive chart review conducted prior to today's encounter.     HPI:   Mr. Kurt Stoll is a 41 y.o. male presenting to discuss his chronically elevated CK.  The patient shares that for over 5 years, he has had consistently elevated CK levels.  Workup in the past has included EMG/NCV which was found to be normal.  It also included some routine labs which were reportedly uneventful.  As the patient is a tall and muscular man, he was told that CK could potentially be elevated due to his exercise regimen which includes running 3 miles 3 times a week and regular weight lifting.  Overall, he has not seen a he has substantial progression in any specific symptoms over the years until recently.  For the last few weeks, he has noticed a change in left-sided strength as he does pushups.  He feels as if his left upper extremity is weaker than the right during certain exercises.  No recent injuries to the limb.  No issues with fine motor tasks or  strength.  Denies any issues with standing from a sitting position.    Denies other medical or neurological complaints at this time.    PAST MEDICAL HISTORY:  History reviewed. No pertinent past medical history.    PAST SURGICAL HISTORY:  Past Surgical History:   Procedure Laterality Date    TYMPANOSTOMY TUBE PLACEMENT         CURRENT MEDS:  Current Outpatient Medications   Medication Sig Dispense Refill    loratadine (CLARITIN) 10 mg tablet Take 10 mg by mouth once daily.       No current facility-administered medications for this visit.     ALLERGIES:  Review of patient's allergies indicates:  No Known Allergies    FAMILY HISTORY:  Family History   Problem Relation Age of  "Onset    Aneurysm Mother     No Known Problems Father     Heart disease Paternal Grandfather     No Known Problems Sister     No Known Problems Brother     Aneurysm Maternal Aunt     Aneurysm Maternal Uncle        SOCIAL HISTORY:  Social History     Tobacco Use    Smoking status: Former Smoker     Packs/day: 1.00     Years: 9.00     Pack years: 9.00     Quit date: 2009     Years since quittin.1    Smokeless tobacco: Never Used   Substance Use Topics    Alcohol use: Yes     Comment: 17 drinks/weekend:  wine and liquor.     Review of Systems:  Gen: no fever, no chills, no generalized feeling of weakness   HEENT: no double vision, no blurred vision, no eye pain, no eye exudates. no nasal congestion,   no traumatic injury of head, no neck pain, no neck stiffness. no photophobia or phonophobia at this time. ?    Heart: no chest pain, no SOB    Lungs: no SOB, no cough    MSK: no weakness of legs, intact ROM    ABD: no abd pain, no N/V/D/C, no difficulty with defecation.    Extremities: No leg pain, no edema.       Objective:     Vitals:    22 1219   BP: (!) 140/92   Pulse: 61   Weight: 106.2 kg (234 lb 2.1 oz)   Height: 6' 4" (1.93 m)     General:  Male in NAD, alert and awake, Aox3, well groomed. ?    ? ?    HEENT: Head is NC/AT EOMI, pupil size: 4 mm B/L, no nystagmus noted; hearing grossly intact b/l.     Neck: Supple. no nuchal rigidity.      Cardiovascular: well perfused, no cyanosis        Respiratory: Symmetric chest rise noted       Musculoskeletal: Muscle tone noted to be adequate for patient age, muscle mass is WNL.    Fasciculations observed in the bilateral calves but no other locations     Extremities: No pedal edema or calf tenderness. No cogwheel rigidity noted on B/L UE extremities.  No forearm wasting       Neurological Examination.    Mental status: AA&O x3; Affect/mood is euthymic/congruent; no aphasia noted during examination. Patient answers simple questions appropriately & " follows simple commands; no dysarthria or expressive aphasia; no jocelin-neglect or extinction. Vocabulary/word finding: excellent.       Cranial Nerves: II-XII grossly intact.        Muscle Function: Tone WNL and Muscle bulk WNL. Left elbow flexors(5/5)/extensors (4-/5), Left shoulder (4+/5); left Finger flexor/extensors (5/5). Right elbow flexors/extensors (5/5). Right shoulder abduction/flexion (5/5), Right finger flexors/extensors (5/5). Left LE hip flexion/extension (5/5), Left Knee flexion(4/5)/extension (5/5) and Left ankle flexion(4+/5)/extension/Eversion/inversion (5/5). Right LE hip flexion/extension (5/5), Right Knee flexion/extension (5/5) and ankle flexion/extension/Eversion/inversion (5/5).    No distal finger weakness     Sensory: ?Pain/sharp, proprioception, and light touch are grossly intact in bilateral upper and lower extremities, face, and trunk.       Reflexes: 2+/4 throughout; Babinskis were down going B/L      Coordination: no dysmetria (finger to nose negative)     Gait: adequate casual gait with stride length and arm swing WNL. Tandem gait and walking on toes and heels are WNL     Assessment:   Kurt Stoll is a 41 y.o. male presenting for evaluation of chronically elevated CK.  Given the patient's height and build, this chronic elevation may reflect his increased muscle mass and regular healing from significant exercise routine.  Given his newly developed areas of focal weakness at the left triceps, left knee flexors, and left ankle flexors along with fasciculations in the bilateral calves, repeat EMG/NCV is warranted in this case.  We will also initiate myositis lab studies for further case clarity.    Plan:     Problem List Items Addressed This Visit        Neuro    Muscle twitch - Primary    Relevant Orders    EMG W/ ULTRASOUND AND NERVE CONDUCTION TEST 2 Extremities    ALDOLASE    CK    VITAMIN B12    VITAMIN B1    LACTATE DEHYDROGENASE    MYOSITIS ASSESSR PLUS WILMA-1    ANTI -SSA  ANTIBODY    ANTI-SSB ANTIBODY       Other    Elevated CPK    Relevant Orders    EMG W/ ULTRASOUND AND NERVE CONDUCTION TEST 2 Extremities    LACTATE DEHYDROGENASE    MYOSITIS ASSESSR PLUS WILMA-1    ANTI -SSA ANTIBODY    ANTI-SSB ANTIBODY    Weakness    Relevant Orders    ALDOLASE    CK    VITAMIN B12    VITAMIN B1    LACTATE DEHYDROGENASE    MYOSITIS ASSESSR PLUS WILMA-1    ANTI -SSA ANTIBODY    ANTI-SSB ANTIBODY        Patient was instructed to reach out to our clinic immediately if new or worsening weakness occurs.  We will establish plans for follow-up based on results of laboratory studies and EMG.    I spent a total of 60 minutes on the day of the visit. This includes face to face time and non-face to face time preparing to see the patient (eg, review of tests), obtaining and/or reviewing separately obtained history, documenting clinical information in the electronic or other health record, independently interpreting results and communicating results to the patient/family/caregiver, or care coordinator.    A dictation device was used to produce this document. Use of such devices sometimes results in grammatical errors or replacement of words that sound similarly.    Blair Aguilar, DO

## 2022-03-28 ENCOUNTER — PATIENT MESSAGE (OUTPATIENT)
Dept: NEUROLOGY | Facility: CLINIC | Age: 42
End: 2022-03-28
Payer: COMMERCIAL

## 2022-03-28 ENCOUNTER — LAB VISIT (OUTPATIENT)
Dept: LAB | Facility: HOSPITAL | Age: 42
End: 2022-03-28
Attending: PSYCHIATRY & NEUROLOGY
Payer: COMMERCIAL

## 2022-03-28 ENCOUNTER — PROCEDURE VISIT (OUTPATIENT)
Dept: PHYSICAL MEDICINE AND REHAB | Facility: CLINIC | Age: 42
End: 2022-03-28
Payer: COMMERCIAL

## 2022-03-28 DIAGNOSIS — R25.3 MUSCLE TWITCH: ICD-10-CM

## 2022-03-28 DIAGNOSIS — R74.8 ELEVATED CPK: ICD-10-CM

## 2022-03-28 DIAGNOSIS — R53.1 WEAKNESS: ICD-10-CM

## 2022-03-28 LAB
CK SERPL-CCNC: 488 U/L (ref 20–200)
LDH SERPL L TO P-CCNC: 246 U/L (ref 110–260)
VIT B12 SERPL-MCNC: 419 PG/ML (ref 210–950)

## 2022-03-28 PROCEDURE — 83615 LACTATE (LD) (LDH) ENZYME: CPT | Performed by: PSYCHIATRY & NEUROLOGY

## 2022-03-28 PROCEDURE — 86235 NUCLEAR ANTIGEN ANTIBODY: CPT | Mod: 59 | Performed by: PSYCHIATRY & NEUROLOGY

## 2022-03-28 PROCEDURE — 95913 NRV CNDJ TEST 13/> STUDIES: CPT | Mod: S$GLB,,, | Performed by: PHYSICAL MEDICINE & REHABILITATION

## 2022-03-28 PROCEDURE — 82607 VITAMIN B-12: CPT | Performed by: PSYCHIATRY & NEUROLOGY

## 2022-03-28 PROCEDURE — 82085 ASSAY OF ALDOLASE: CPT | Performed by: PSYCHIATRY & NEUROLOGY

## 2022-03-28 PROCEDURE — 95886 PR EMG COMPLETE, W/ NERVE CONDUCTION STUDIES, 5+ MUSCLES: ICD-10-PCS | Mod: S$GLB,,, | Performed by: PHYSICAL MEDICINE & REHABILITATION

## 2022-03-28 PROCEDURE — 95913 PR NERVE CONDUCTION STUDY; 13 OR MORE STUDIES: ICD-10-PCS | Mod: S$GLB,,, | Performed by: PHYSICAL MEDICINE & REHABILITATION

## 2022-03-28 PROCEDURE — 82550 ASSAY OF CK (CPK): CPT | Performed by: PSYCHIATRY & NEUROLOGY

## 2022-03-28 PROCEDURE — 95886 MUSC TEST DONE W/N TEST COMP: CPT | Mod: S$GLB,,, | Performed by: PHYSICAL MEDICINE & REHABILITATION

## 2022-03-28 PROCEDURE — 36415 COLL VENOUS BLD VENIPUNCTURE: CPT | Performed by: PSYCHIATRY & NEUROLOGY

## 2022-03-28 PROCEDURE — 84425 ASSAY OF VITAMIN B-1: CPT | Performed by: PSYCHIATRY & NEUROLOGY

## 2022-03-28 PROCEDURE — 83516 IMMUNOASSAY NONANTIBODY: CPT | Mod: 59 | Performed by: PSYCHIATRY & NEUROLOGY

## 2022-03-28 PROCEDURE — 86235 NUCLEAR ANTIGEN ANTIBODY: CPT | Performed by: PSYCHIATRY & NEUROLOGY

## 2022-03-28 NOTE — PROCEDURES
Test Date:  3/28/2022    Patient: Kurt Stoll : 1980 Physician: Dewayne Lang D.O.   ID#:  SEX: Male Ref. Phys: Blair Aguilar, DO     HPI: Kurt Stoll is a 41 y.o.male who presents for NCS/EMG to evaluate for myopathy.  Pt with several year history of elevated CK in the 400s.  He had a transient weakness of his left shoulder a few weeks ago, which has improved, though not back to baseline still.  He does have intermittent tingling of his fingers bilaterally, mostly while at work on computer, which he thought to be due to work station ergonomics.  He also notes muscle rippling in his calf muscles, feet, and forearms sometimes.       NCV & EMG Findings:   Evaluation of the left median sensory, the right median sensory, the left ulnar sensory, and the right ulnar sensory nerves showed prolonged distal peak latency and decreased conduction velocity.   The right radial sensory nerve showed prolonged distal peak latency.   All remaining nerves (as indicated in the following tables) were within normal limits.   All examined muscles (as indicated in the following table) showed no evidence of electrical instability.    Impression:  1. There is evidence of a mild sensory peripheral polyneuropathy affecting the bilateral upper extremities, relatively symmetrically.    2. No electrophysiologic evidence of myopathy  3. Note that needle EMG can transiently elevate CK levels.  Also note that the right upper and right lower extremities were not checked with needle EMG, and thus would be suitable for muscle biopsy if needed in the future.      ___________________________  Dewayne Lang D.O.        NCS+  Motor Nerve Results      Latency Amplitude F-Lat Segment Distance CV Comment   Site (ms) Norm (mV) Norm (ms)  (cm) (m/s) Norm    Left Median (APB)   Wrist 4.2  < 4.6 7.4  > 5.9  Wrist-Palm - - -    Elbow 9.0 - 7.5 -  Elbow-Wrist 25 52  > 49    Right Median (APB)   Wrist 4.0  < 4.6 11.8  > 5.9   Wrist-Palm - - -    Elbow 9.1 - 11.4 -  Elbow-Wrist 26 51  > 49    Left Ulnar (ADM)   Wrist 3.2  < 3.7 8.7  > 3.0         Bel Elbow 7.3 - 8.4 -  Bel Elbow-Wrist 25 61  > 52    Abv Elbow 9.3 - 9.9 -  Abv Elbow-Bel Elbow 11 55  > 43    Right Ulnar (ADM)   Wrist 3.3  < 3.7 10.2  > 3.0         Bel Elbow 7.2 - 10.5 -  Bel Elbow-Wrist 25 64  > 52    Abv Elbow 9.2 - 10.5 -  Abv Elbow-Bel Elbow 12 60  > 43    Left Fibular (EDB)   Ankle 4.2  < 6.5 4.3  > 2.6         Bel Fib Head 12.7 - 3.7 -  Bel Fib Head-Ankle 39 46  > 37    Right Fibular (EDB)   Ankle 3.7  < 6.5 4.0  > 2.6         Bel Fib Head 12.6 - 3.5 -  Bel Fib Head-Ankle 39 44  > 37    Left Tibial (AHB)   Ankle 4.9  < 6.1 10.9  > 5.3 57.1        Right Tibial (AHB)   Ankle 4.6  < 6.1 7.6  > 5.3           Sensory Nerve Results      Latency (Peak) Amplitude (P-P) Segment Distance CV Comment   Site (ms) Norm (µV) Norm  (cm) (m/s) Norm    Left Median   Wrist-Dig II *4.5  < 4.0 75  > 13 Wrist-Dig II 16 *36  > 39    Right Median   Wrist-Dig II *4.7  < 4.0 49  > 13 Wrist-Dig II 15 *32  > 39    Left Ulnar   Wrist-Dig V *4.7  < 4.0 20  > 8 Wrist-Dig V 16 *34  > 38    Right Ulnar   Wrist-Dig V *4.4  < 4.0 23  > 8 Wrist-Dig V 14 *32  > 38    Right Radial   Forearm-Wrist *3.1  < 2.8 24  > 11 Forearm-Wrist 10 *32 > 38    Right Sural   Calf-Lat Mall 4.0  < 4.5 10  > 4 Calf-Lat Mall 15 38  > 35    Left Superficial Fibular   14 cm-Ankle 3.6  < 4.2 11  > 5 14 cm-Ankle 14 39  > 32      EMG+     Side Muscle Nerve Root Ins Act Fibs Psw Amp Dur Poly Recrt Int Pat Comment   Left Deltoid Axillary C5-C6 Nml Nml Nml Nml Nml 0 Nml Nml    Left Pronator Teres Median C6-C7 Nml Nml Nml Nml Nml 0 Nml Nml    Left Triceps Radial C6-C8 Nml Nml Nml Nml Nml 0 Nml Nml    Left EDC Radial,  Posterior In... C7-C8 Nml Nml Nml Nml Nml 0 Nml Nml    Left FDI Ulnar C8-T1 Nml Nml Nml Nml Nml 0 Nml Nml    Left Vastus Med Femoral L2-L4 Nml Nml Nml Nml Nml 0 Nml Nml    Left Tib Anterior Fibular,  Deep Fibula... L4-L5  Nml Nml Nml Nml Nml 0 Nml Nml    Left Fib Longus Fibular,  Superficial... L5-S1 Nml Nml Nml Nml Nml 0 Nml Nml    Left Gastroc Tibial S1-S2 Nml Nml Nml Nml Nml 0 Nml Nml    Left Dorsal Interossei ped I Lateral Plantar S2-S3 Nml Nml Nml Nml Nml 0 Nml Nml            Waveforms:    Motor                         F-Wave     Sensory

## 2022-03-30 LAB — ALDOLASE SERPL-CCNC: 7 U/L (ref 1.2–7.6)

## 2022-04-01 LAB
ANTI-SSA ANTIBODY: 0.05 RATIO (ref 0–0.99)
ANTI-SSA INTERPRETATION: NEGATIVE
ANTI-SSB ANTIBODY: 0.04 RATIO (ref 0–0.99)
ANTI-SSB INTERPRETATION: NEGATIVE
VIT B1 BLD-MCNC: 49 UG/L (ref 38–122)

## 2022-04-07 LAB
EJ AB SER QL: NOT DETECTED
ENA JO1 AB SER IA-ACNC: NORMAL AI
KU AB SER QL: NOT DETECTED
MI2 AB SER QL: NOT DETECTED
OJ AB SER QL: NOT DETECTED
PL12 AB SER QL: NOT DETECTED
PL7 AB SER QL: NOT DETECTED
SRP AB SERPL QL: NOT DETECTED

## 2022-09-06 ENCOUNTER — LAB VISIT (OUTPATIENT)
Dept: LAB | Facility: HOSPITAL | Age: 42
End: 2022-09-06
Attending: FAMILY MEDICINE
Payer: COMMERCIAL

## 2022-09-06 ENCOUNTER — OFFICE VISIT (OUTPATIENT)
Dept: INTERNAL MEDICINE | Facility: CLINIC | Age: 42
End: 2022-09-06
Attending: FAMILY MEDICINE
Payer: COMMERCIAL

## 2022-09-06 VITALS
SYSTOLIC BLOOD PRESSURE: 132 MMHG | WEIGHT: 243.75 LBS | HEIGHT: 76 IN | OXYGEN SATURATION: 99 % | HEART RATE: 75 BPM | DIASTOLIC BLOOD PRESSURE: 86 MMHG | BODY MASS INDEX: 29.68 KG/M2

## 2022-09-06 DIAGNOSIS — Z00.00 ANNUAL PHYSICAL EXAM: ICD-10-CM

## 2022-09-06 DIAGNOSIS — Z00.00 ANNUAL PHYSICAL EXAM: Primary | ICD-10-CM

## 2022-09-06 DIAGNOSIS — R74.8 ELEVATED CPK: ICD-10-CM

## 2022-09-06 LAB
ANION GAP SERPL CALC-SCNC: 12 MMOL/L (ref 8–16)
BUN SERPL-MCNC: 14 MG/DL (ref 6–20)
CALCIUM SERPL-MCNC: 10.4 MG/DL (ref 8.7–10.5)
CHLORIDE SERPL-SCNC: 102 MMOL/L (ref 95–110)
CHOLEST SERPL-MCNC: 173 MG/DL (ref 120–199)
CHOLEST/HDLC SERPL: 2.7 {RATIO} (ref 2–5)
CO2 SERPL-SCNC: 28 MMOL/L (ref 23–29)
CREAT SERPL-MCNC: 1.2 MG/DL (ref 0.5–1.4)
EST. GFR  (NO RACE VARIABLE): >60 ML/MIN/1.73 M^2
GLUCOSE SERPL-MCNC: 99 MG/DL (ref 70–110)
HDLC SERPL-MCNC: 63 MG/DL (ref 40–75)
HDLC SERPL: 36.4 % (ref 20–50)
LDLC SERPL CALC-MCNC: 90.4 MG/DL (ref 63–159)
NONHDLC SERPL-MCNC: 110 MG/DL
POTASSIUM SERPL-SCNC: 3.9 MMOL/L (ref 3.5–5.1)
SODIUM SERPL-SCNC: 142 MMOL/L (ref 136–145)
TRIGL SERPL-MCNC: 98 MG/DL (ref 30–150)

## 2022-09-06 PROCEDURE — 1160F PR REVIEW ALL MEDS BY PRESCRIBER/CLIN PHARMACIST DOCUMENTED: ICD-10-PCS | Mod: CPTII,S$GLB,, | Performed by: FAMILY MEDICINE

## 2022-09-06 PROCEDURE — 1160F RVW MEDS BY RX/DR IN RCRD: CPT | Mod: CPTII,S$GLB,, | Performed by: FAMILY MEDICINE

## 2022-09-06 PROCEDURE — 99395 PR PREVENTIVE VISIT,EST,18-39: ICD-10-PCS | Mod: S$GLB,,, | Performed by: FAMILY MEDICINE

## 2022-09-06 PROCEDURE — 80061 LIPID PANEL: CPT | Performed by: FAMILY MEDICINE

## 2022-09-06 PROCEDURE — 3079F PR MOST RECENT DIASTOLIC BLOOD PRESSURE 80-89 MM HG: ICD-10-PCS | Mod: CPTII,S$GLB,, | Performed by: FAMILY MEDICINE

## 2022-09-06 PROCEDURE — 36415 COLL VENOUS BLD VENIPUNCTURE: CPT | Performed by: FAMILY MEDICINE

## 2022-09-06 PROCEDURE — 3079F DIAST BP 80-89 MM HG: CPT | Mod: CPTII,S$GLB,, | Performed by: FAMILY MEDICINE

## 2022-09-06 PROCEDURE — 1159F PR MEDICATION LIST DOCUMENTED IN MEDICAL RECORD: ICD-10-PCS | Mod: CPTII,S$GLB,, | Performed by: FAMILY MEDICINE

## 2022-09-06 PROCEDURE — 3008F PR BODY MASS INDEX (BMI) DOCUMENTED: ICD-10-PCS | Mod: CPTII,S$GLB,, | Performed by: FAMILY MEDICINE

## 2022-09-06 PROCEDURE — 1159F MED LIST DOCD IN RCRD: CPT | Mod: CPTII,S$GLB,, | Performed by: FAMILY MEDICINE

## 2022-09-06 PROCEDURE — 80048 BASIC METABOLIC PNL TOTAL CA: CPT | Performed by: FAMILY MEDICINE

## 2022-09-06 PROCEDURE — 3075F SYST BP GE 130 - 139MM HG: CPT | Mod: CPTII,S$GLB,, | Performed by: FAMILY MEDICINE

## 2022-09-06 PROCEDURE — 99999 PR PBB SHADOW E&M-EST. PATIENT-LVL III: CPT | Mod: PBBFAC,,, | Performed by: FAMILY MEDICINE

## 2022-09-06 PROCEDURE — 3075F PR MOST RECENT SYSTOLIC BLOOD PRESS GE 130-139MM HG: ICD-10-PCS | Mod: CPTII,S$GLB,, | Performed by: FAMILY MEDICINE

## 2022-09-06 PROCEDURE — 3008F BODY MASS INDEX DOCD: CPT | Mod: CPTII,S$GLB,, | Performed by: FAMILY MEDICINE

## 2022-09-06 PROCEDURE — 99999 PR PBB SHADOW E&M-EST. PATIENT-LVL III: ICD-10-PCS | Mod: PBBFAC,,, | Performed by: FAMILY MEDICINE

## 2022-09-06 PROCEDURE — 99395 PREV VISIT EST AGE 18-39: CPT | Mod: S$GLB,,, | Performed by: FAMILY MEDICINE

## 2022-09-06 NOTE — PROGRESS NOTES
Subjective:       Patient ID: Kurt Stoll is a 41 y.o. male.    Chief Complaint: Annual Exam    Established patient for an annual wellness check/physical exam and also chronic disease management. Specific complaints - see dictation, M*model entries and please see ROS.  Past, Surgical, Family, Social Histories; Medications, Allergies reviewed and reconciled.  Health maintenance file reviewed and addressed items due. Recent applicable lab, imaging and cardiovascular results reviewed.  Problem list items reviewed and modified or added entries (in the overview section) may not be transcribed into this encounter note due to note writer format.      No new symptoms. Saw neurologist mid year. No dx.    Review of Systems   Constitutional:  Negative for activity change, appetite change, chills, diaphoresis, fatigue, fever and unexpected weight change.   HENT:  Negative for congestion, hearing loss, postnasal drip, rhinorrhea, sore throat and trouble swallowing.    Eyes:  Negative for discharge and visual disturbance.   Respiratory:  Negative for cough, choking, chest tightness, shortness of breath and wheezing.    Cardiovascular:  Negative for chest pain, palpitations and leg swelling.   Gastrointestinal:  Negative for abdominal distention, abdominal pain, blood in stool, constipation, diarrhea, nausea and vomiting.   Endocrine: Negative for polydipsia and polyuria.   Genitourinary:  Negative for difficulty urinating, hematuria and urgency.   Musculoskeletal:  Negative for arthralgias, joint swelling, myalgias and neck pain.   Skin:  Negative for rash.   Neurological:  Negative for weakness, light-headedness and headaches.   Psychiatric/Behavioral:  Negative for confusion and dysphoric mood.      Objective:      Physical Exam  Vitals and nursing note reviewed.   Constitutional:       Appearance: He is well-developed. He is not diaphoretic.   Eyes:      General: No scleral icterus.  Neck:      Thyroid: No thyromegaly.       Vascular: No carotid bruit or JVD.      Trachea: No tracheal deviation.   Cardiovascular:      Rate and Rhythm: Normal rate and regular rhythm.      Heart sounds: Normal heart sounds. No murmur heard.    No friction rub. No gallop.   Pulmonary:      Effort: Pulmonary effort is normal. No respiratory distress.      Breath sounds: Normal breath sounds. No wheezing or rales.   Abdominal:      General: There is no distension.      Palpations: Abdomen is soft. There is no mass.      Tenderness: There is no abdominal tenderness. There is no guarding or rebound.   Musculoskeletal:      Cervical back: Normal range of motion and neck supple.   Lymphadenopathy:      Cervical: No cervical adenopathy.   Skin:     General: Skin is warm and dry.      Findings: No erythema or rash.   Neurological:      Mental Status: He is alert and oriented to person, place, and time.      Cranial Nerves: No cranial nerve deficit.      Motor: No tremor.      Coordination: Coordination normal.      Gait: Gait normal.   Psychiatric:         Behavior: Behavior normal.         Thought Content: Thought content normal.         Judgment: Judgment normal.       Assessment:       1. Annual physical exam    2. Elevated CPK          Plan:     Medication List with Changes/Refills   Current Medications    LORATADINE (CLARITIN) 10 MG TABLET    Take 10 mg by mouth once daily.     Kurt was seen today for annual exam.    Diagnoses and all orders for this visit:    Annual physical exam  -     Basic Metabolic Panel; Future  -     Lipid Panel; Future    Elevated CPK  Comments:  Consider monitoring CPK periodically such as annual.  No recommendation made from neurologist.    See meds, orders, follow up, routing and instructions sections of encounter and AVS. Discussed with patient and provided on AVS.      Additional POC documentation    Elevated CPK  Evaluated in Neurology in 2017 and again 2/7/2022. No progression of symptoms    Lab Results   Component Value Date      08/25/2021    K 4.1 08/25/2021     08/25/2021    BUN 15 08/25/2021    CREATININE 1.2 08/25/2021    GLU 94 08/25/2021    AST 40 08/25/2021    ALT 33 08/25/2021    ALBUMIN 4.3 08/25/2021    PROT 7.3 08/25/2021    BILITOT 0.6 08/25/2021    CHOL 154 08/25/2021    HDL 55 08/25/2021    LDLCALC 82.8 08/25/2021    TRIG 81 08/25/2021    WBC 4.74 08/25/2021    HGB 15.1 08/25/2021    HCT 43.7 08/25/2021     08/25/2021    TSH 2.445 08/25/2021

## 2023-01-18 ENCOUNTER — PATIENT MESSAGE (OUTPATIENT)
Dept: INTERNAL MEDICINE | Facility: CLINIC | Age: 43
End: 2023-01-18
Payer: COMMERCIAL

## 2023-07-11 ENCOUNTER — PATIENT MESSAGE (OUTPATIENT)
Dept: INTERNAL MEDICINE | Facility: CLINIC | Age: 43
End: 2023-07-11
Payer: COMMERCIAL

## 2023-07-11 DIAGNOSIS — Z00.00 ANNUAL PHYSICAL EXAM: Primary | ICD-10-CM

## 2023-07-15 ENCOUNTER — PATIENT MESSAGE (OUTPATIENT)
Dept: INTERNAL MEDICINE | Facility: CLINIC | Age: 43
End: 2023-07-15
Payer: COMMERCIAL

## 2023-07-15 DIAGNOSIS — R74.8 ELEVATED CPK: Primary | ICD-10-CM

## 2023-07-16 NOTE — TELEPHONE ENCOUNTER
Patient has an existing appointment - See lab orders and please call patient to schedule labs before appointment. Thank you    See standing or future lab orders and please draw A1C, CMP, Lipids, and CK and hepatitis C  - prior to upcoming visit, thank you.

## 2023-07-19 ENCOUNTER — PATIENT MESSAGE (OUTPATIENT)
Dept: INTERNAL MEDICINE | Facility: CLINIC | Age: 43
End: 2023-07-19
Payer: COMMERCIAL

## 2023-09-11 ENCOUNTER — LAB VISIT (OUTPATIENT)
Dept: LAB | Facility: HOSPITAL | Age: 43
End: 2023-09-11
Attending: FAMILY MEDICINE
Payer: COMMERCIAL

## 2023-09-11 ENCOUNTER — OFFICE VISIT (OUTPATIENT)
Dept: INTERNAL MEDICINE | Facility: CLINIC | Age: 43
End: 2023-09-11
Attending: FAMILY MEDICINE
Payer: COMMERCIAL

## 2023-09-11 VITALS
OXYGEN SATURATION: 97 % | HEART RATE: 68 BPM | SYSTOLIC BLOOD PRESSURE: 130 MMHG | WEIGHT: 253.31 LBS | BODY MASS INDEX: 30.85 KG/M2 | DIASTOLIC BLOOD PRESSURE: 82 MMHG | HEIGHT: 76 IN

## 2023-09-11 DIAGNOSIS — R74.8 ELEVATED CPK: ICD-10-CM

## 2023-09-11 DIAGNOSIS — Z00.00 ANNUAL PHYSICAL EXAM: ICD-10-CM

## 2023-09-11 DIAGNOSIS — Z00.00 ANNUAL PHYSICAL EXAM: Primary | ICD-10-CM

## 2023-09-11 LAB
ALBUMIN SERPL BCP-MCNC: 4.3 G/DL (ref 3.5–5.2)
ALP SERPL-CCNC: 41 U/L (ref 55–135)
ALT SERPL W/O P-5'-P-CCNC: 28 U/L (ref 10–44)
ANION GAP SERPL CALC-SCNC: 8 MMOL/L (ref 8–16)
AST SERPL-CCNC: 30 U/L (ref 10–40)
BILIRUB SERPL-MCNC: 0.6 MG/DL (ref 0.1–1)
BUN SERPL-MCNC: 16 MG/DL (ref 6–20)
CALCIUM SERPL-MCNC: 9.6 MG/DL (ref 8.7–10.5)
CHLORIDE SERPL-SCNC: 103 MMOL/L (ref 95–110)
CHOLEST SERPL-MCNC: 144 MG/DL (ref 120–199)
CHOLEST/HDLC SERPL: 2.4 {RATIO} (ref 2–5)
CK SERPL-CCNC: 343 U/L (ref 20–200)
CO2 SERPL-SCNC: 27 MMOL/L (ref 23–29)
CREAT SERPL-MCNC: 1.2 MG/DL (ref 0.5–1.4)
EST. GFR  (NO RACE VARIABLE): >60 ML/MIN/1.73 M^2
ESTIMATED AVG GLUCOSE: 105 MG/DL (ref 68–131)
GLUCOSE SERPL-MCNC: 91 MG/DL (ref 70–110)
HBA1C MFR BLD: 5.3 % (ref 4–5.6)
HCV AB SERPL QL IA: NORMAL
HDLC SERPL-MCNC: 59 MG/DL (ref 40–75)
HDLC SERPL: 41 % (ref 20–50)
LDLC SERPL CALC-MCNC: 62.8 MG/DL (ref 63–159)
NONHDLC SERPL-MCNC: 85 MG/DL
POTASSIUM SERPL-SCNC: 4.2 MMOL/L (ref 3.5–5.1)
PROT SERPL-MCNC: 7.3 G/DL (ref 6–8.4)
SODIUM SERPL-SCNC: 138 MMOL/L (ref 136–145)
TRIGL SERPL-MCNC: 111 MG/DL (ref 30–150)

## 2023-09-11 PROCEDURE — 99999 PR PBB SHADOW E&M-EST. PATIENT-LVL IV: CPT | Mod: PBBFAC,,, | Performed by: FAMILY MEDICINE

## 2023-09-11 PROCEDURE — 3079F DIAST BP 80-89 MM HG: CPT | Mod: CPTII,S$GLB,, | Performed by: FAMILY MEDICINE

## 2023-09-11 PROCEDURE — 83036 HEMOGLOBIN GLYCOSYLATED A1C: CPT | Performed by: FAMILY MEDICINE

## 2023-09-11 PROCEDURE — 1160F RVW MEDS BY RX/DR IN RCRD: CPT | Mod: CPTII,S$GLB,, | Performed by: FAMILY MEDICINE

## 2023-09-11 PROCEDURE — 36415 COLL VENOUS BLD VENIPUNCTURE: CPT | Performed by: FAMILY MEDICINE

## 2023-09-11 PROCEDURE — 1159F MED LIST DOCD IN RCRD: CPT | Mod: CPTII,S$GLB,, | Performed by: FAMILY MEDICINE

## 2023-09-11 PROCEDURE — 86803 HEPATITIS C AB TEST: CPT | Performed by: FAMILY MEDICINE

## 2023-09-11 PROCEDURE — 3079F PR MOST RECENT DIASTOLIC BLOOD PRESSURE 80-89 MM HG: ICD-10-PCS | Mod: CPTII,S$GLB,, | Performed by: FAMILY MEDICINE

## 2023-09-11 PROCEDURE — 80053 COMPREHEN METABOLIC PANEL: CPT | Performed by: FAMILY MEDICINE

## 2023-09-11 PROCEDURE — 99396 PR PREVENTIVE VISIT,EST,40-64: ICD-10-PCS | Mod: S$GLB,,, | Performed by: FAMILY MEDICINE

## 2023-09-11 PROCEDURE — 3008F PR BODY MASS INDEX (BMI) DOCUMENTED: ICD-10-PCS | Mod: CPTII,S$GLB,, | Performed by: FAMILY MEDICINE

## 2023-09-11 PROCEDURE — 3008F BODY MASS INDEX DOCD: CPT | Mod: CPTII,S$GLB,, | Performed by: FAMILY MEDICINE

## 2023-09-11 PROCEDURE — 99396 PREV VISIT EST AGE 40-64: CPT | Mod: S$GLB,,, | Performed by: FAMILY MEDICINE

## 2023-09-11 PROCEDURE — 1159F PR MEDICATION LIST DOCUMENTED IN MEDICAL RECORD: ICD-10-PCS | Mod: CPTII,S$GLB,, | Performed by: FAMILY MEDICINE

## 2023-09-11 PROCEDURE — 82550 ASSAY OF CK (CPK): CPT | Performed by: FAMILY MEDICINE

## 2023-09-11 PROCEDURE — 3075F SYST BP GE 130 - 139MM HG: CPT | Mod: CPTII,S$GLB,, | Performed by: FAMILY MEDICINE

## 2023-09-11 PROCEDURE — 80061 LIPID PANEL: CPT | Performed by: FAMILY MEDICINE

## 2023-09-11 PROCEDURE — 99999 PR PBB SHADOW E&M-EST. PATIENT-LVL IV: ICD-10-PCS | Mod: PBBFAC,,, | Performed by: FAMILY MEDICINE

## 2023-09-11 PROCEDURE — 1160F PR REVIEW ALL MEDS BY PRESCRIBER/CLIN PHARMACIST DOCUMENTED: ICD-10-PCS | Mod: CPTII,S$GLB,, | Performed by: FAMILY MEDICINE

## 2023-09-11 PROCEDURE — 3075F PR MOST RECENT SYSTOLIC BLOOD PRESS GE 130-139MM HG: ICD-10-PCS | Mod: CPTII,S$GLB,, | Performed by: FAMILY MEDICINE

## 2023-09-11 NOTE — PROGRESS NOTES
Subjective:       Patient ID: Kurt Stoll is a 42 y.o. male.    Chief Complaint: Annual Exam    Established patient for an annual wellness check/physical exam and also chronic disease management. Specific complaints - see dictation, M*model entries and please see ROS.  Past, Surgical, Family, Social Histories; Medications, Allergies reviewed and reconciled.  Health maintenance file reviewed and addressed items due. Recent applicable lab, imaging and cardiovascular results reviewed.  Problem list items reviewed and modified or added entries (in the overview section) may not be transcribed into this encounter note due to note writer format.      Concerning myalgias and twitching, there have been no symptomatic changes. Was previously evaluated by neuro with no f/u rec's.    Exercises - rows and lifts weights, no impediments (other than some Lacrosse this summer).        Review of Systems   Constitutional:  Negative for appetite change, chills, diaphoresis, fatigue and fever.   HENT:  Negative for congestion, postnasal drip, rhinorrhea, sore throat and trouble swallowing.    Eyes:  Negative for visual disturbance.   Respiratory:  Negative for cough, choking, chest tightness, shortness of breath and wheezing.    Cardiovascular:  Negative for chest pain and leg swelling.   Gastrointestinal:  Negative for abdominal distention, abdominal pain, diarrhea, nausea and vomiting.   Genitourinary:  Negative for difficulty urinating and hematuria.   Musculoskeletal:  Negative for arthralgias and myalgias.   Skin:  Negative for rash.   Neurological:  Negative for weakness, light-headedness and headaches.   Psychiatric/Behavioral:  Negative for confusion and dysphoric mood.        Objective:      Physical Exam  Vitals and nursing note reviewed.   Constitutional:       Appearance: He is well-developed. He is not diaphoretic.   Eyes:      General: No scleral icterus.  Neck:      Thyroid: No thyromegaly.      Vascular: No carotid  bruit or JVD.      Trachea: No tracheal deviation.   Cardiovascular:      Rate and Rhythm: Normal rate and regular rhythm.      Heart sounds: Normal heart sounds. No murmur heard.     No friction rub. No gallop.   Pulmonary:      Effort: Pulmonary effort is normal. No respiratory distress.      Breath sounds: Normal breath sounds. No wheezing or rales.   Abdominal:      General: There is no distension.      Palpations: Abdomen is soft. There is no mass.      Tenderness: There is no abdominal tenderness. There is no guarding or rebound.   Musculoskeletal:      Cervical back: Normal range of motion and neck supple.   Lymphadenopathy:      Cervical: No cervical adenopathy.   Skin:     General: Skin is warm and dry.      Findings: No erythema or rash.   Neurological:      Mental Status: He is alert and oriented to person, place, and time.      Cranial Nerves: No cranial nerve deficit.      Motor: No tremor.      Coordination: Coordination normal.      Gait: Gait normal.   Psychiatric:         Behavior: Behavior normal.         Thought Content: Thought content normal.         Judgment: Judgment normal.         Assessment:       1. Annual physical exam    2. Elevated CPK        Plan:     Medication List with Changes/Refills   Current Medications    LORATADINE (CLARITIN) 10 MG TABLET    Take 10 mg by mouth once daily.     1. Annual physical exam    2. Elevated CPK  Overview:  -Evaluated in Neurology in 2017 and again 2/7/2022, with EMG/NCS (no myopathy) and labs 3/28/2022. No diagnosis made.        See meds, orders, follow up, routing and instructions sections of encounter and AVS. Discussed with patient and provided on AVS.    Discussed diet and exercise as therapeutic modalities for metabolic and other conditions. Provided patient information, which are included as links on the AVS for detailed information.    Lab Results   Component Value Date     09/06/2022    K 3.9 09/06/2022     09/06/2022    BUN 14  09/06/2022    CREATININE 1.2 09/06/2022    GLU 99 09/06/2022    AST 40 08/25/2021    ALT 33 08/25/2021    ALBUMIN 4.3 08/25/2021    PROT 7.3 08/25/2021    BILITOT 0.6 08/25/2021    CHOL 173 09/06/2022    HDL 63 09/06/2022    LDLCALC 90.4 09/06/2022    TRIG 98 09/06/2022    WBC 4.74 08/25/2021    HGB 15.1 08/25/2021    HCT 43.7 08/25/2021     08/25/2021    TSH 2.445 08/25/2021

## 2023-09-11 NOTE — PATIENT INSTRUCTIONS
See standing or future lab orders and please draw CK, hepatitis C, A1C, CMP, and Lipids - today, thank you.

## 2023-11-17 ENCOUNTER — IMMUNIZATION (OUTPATIENT)
Dept: INTERNAL MEDICINE | Facility: CLINIC | Age: 43
End: 2023-11-17
Payer: COMMERCIAL

## 2023-11-17 DIAGNOSIS — Z23 NEED FOR VACCINATION: Primary | ICD-10-CM

## 2023-11-17 PROCEDURE — 90686 IIV4 VACC NO PRSV 0.5 ML IM: CPT | Mod: S$GLB,,, | Performed by: INTERNAL MEDICINE

## 2023-11-17 PROCEDURE — 90686 FLU VACCINE (QUAD) GREATER THAN OR EQUAL TO 3YO PRESERVATIVE FREE IM: ICD-10-PCS | Mod: S$GLB,,, | Performed by: INTERNAL MEDICINE

## 2023-11-17 PROCEDURE — G0008 FLU VACCINE (QUAD) GREATER THAN OR EQUAL TO 3YO PRESERVATIVE FREE IM: ICD-10-PCS | Mod: S$GLB,,, | Performed by: INTERNAL MEDICINE

## 2023-11-17 PROCEDURE — G0008 ADMIN INFLUENZA VIRUS VAC: HCPCS | Mod: S$GLB,,, | Performed by: INTERNAL MEDICINE

## 2023-12-13 ENCOUNTER — ON-DEMAND VIRTUAL (OUTPATIENT)
Dept: URGENT CARE | Facility: CLINIC | Age: 43
End: 2023-12-13
Payer: COMMERCIAL

## 2023-12-13 DIAGNOSIS — J02.9 ACUTE PHARYNGITIS, UNSPECIFIED ETIOLOGY: Primary | ICD-10-CM

## 2023-12-13 PROCEDURE — 99213 OFFICE O/P EST LOW 20 MIN: CPT | Mod: 95,,, | Performed by: NURSE PRACTITIONER

## 2023-12-13 PROCEDURE — 99213 PR OFFICE/OUTPT VISIT, EST, LEVL III, 20-29 MIN: ICD-10-PCS | Mod: 95,,, | Performed by: NURSE PRACTITIONER

## 2023-12-13 RX ORDER — PREDNISONE 20 MG/1
20 TABLET ORAL DAILY
Qty: 5 TABLET | Refills: 0 | Status: SHIPPED | OUTPATIENT
Start: 2023-12-13

## 2023-12-13 NOTE — PATIENT INSTRUCTIONS
PLEASE READ YOUR DISCHARGE INSTRUCTIONS ENTIRELY AS IT CONTAINS IMPORTANT INFORMATION.        Sore throat recommendations: Warm fluids, warm salt water gargles, throat lozenges, tea, honey, soup, rest, hydration.        Tylenol and ibuprofen    Swish and spit out the magic mouthwash, use just as needed for sore throat.    Please return or see your primary care doctor if you develop new or worsening symptoms.     Please arrange follow up with your primary medical clinic as soon as possible. You must understand that you've received an Urgent Care treatment only and that you may be released before all of your medical problems are known or treated. You, the patient, will arrange for follow up as instructed. If your symptoms worsen or fail to improve you should go to the Emergency Room.

## 2023-12-13 NOTE — PROGRESS NOTES
Subjective:      Patient ID: Kurt Stoll is a 43 y.o. male.    Vitals:  vitals were not taken for this visit.     Chief Complaint: Sore Throat      Visit Type: TELE AUDIOVISUAL    Present with the patient at the time of consultation: TELEMED PRESENT WITH PATIENT: None    History reviewed. No pertinent past medical history.  Past Surgical History:   Procedure Laterality Date    TYMPANOSTOMY TUBE PLACEMENT       Review of patient's allergies indicates:  No Known Allergies  Current Outpatient Medications on File Prior to Visit   Medication Sig Dispense Refill    loratadine (CLARITIN) 10 mg tablet Take 10 mg by mouth once daily.       No current facility-administered medications on file prior to visit.     Family History   Problem Relation Age of Onset    Aneurysm Mother     No Known Problems Father     Heart disease Paternal Grandfather     No Known Problems Sister     No Known Problems Brother     Aneurysm Maternal Aunt     Aneurysm Maternal Uncle        Medications Ordered                [id number] - [place] - [address]   [address] [facility] [address]    Telephone: [phone]   Fax: [phone]   Hours: Not open 24 hours                         E-Prescribed (1 of 1)              predniSONE (DELTASONE) 20 MG tablet    Sig: Take 1 tablet (20 mg total) by mouth once daily.       Start: 12/13/23     Quantity: 5 tablet Refills: 0                           Ohs Peq Odvv Intake    12/13/2023  7:18 AM CST - Filed by Patient   Describe your reason for todays visit Feeling sick. Trouble swallowing.   What is your current physical address in the event of a medical emergency? [address]   Are you able to take your vital signs? No   Please attach any relevant images or files          44 yo male with c/o sore throat and uri symptoms. He states fever for several days 100.5. he denies runny nose, nasal congestion. He denies cough. He states pain is on right side. He states white  and bumpy.     Sore Throat   Associated symptoms include headaches. Pertinent negatives include no ear pain or vomiting.       Constitution: Positive for fatigue and fever. Negative for chills.   HENT:  Positive for sore throat. Negative for ear pain and postnasal drip.    Cardiovascular: Negative.    Eyes: Negative.    Respiratory: Negative.     Gastrointestinal: Negative.  Negative for nausea, vomiting and bowel incontinence.   Endocrine: negative.   Genitourinary: Negative.  Negative for dysuria, flank pain, bladder incontinence and pelvic pain.   Musculoskeletal: Negative.  Negative for pain, abnormal ROM of joint and back pain.   Skin: Negative.    Allergic/Immunologic: Negative.    Neurological:  Positive for headaches.   Hematologic/Lymphatic: Negative.    Psychiatric/Behavioral: Negative.          Objective:   The physical exam was conducted virtually.  Physical Exam   Constitutional: He is oriented to person, place, and time. He appears well-developed.   HENT:   Head: Normocephalic and atraumatic.   Ears:   Right Ear: Hearing, tympanic membrane and external ear normal.   Left Ear: Hearing, tympanic membrane and external ear normal.   Nose: Nose normal.   Mouth/Throat: Uvula is midline and mucous membranes are normal. Posterior oropharyngeal erythema present.   Eyes: Conjunctivae and EOM are normal. Pupils are equal, round, and reactive to light.   Neck: Neck supple.   Cardiovascular: Normal rate.   Pulmonary/Chest: Effort normal and breath sounds normal.   Musculoskeletal: Normal range of motion.         General: Normal range of motion.   Neurological: He is alert and oriented to person, place, and time.   Skin: Skin is warm.   Psychiatric: His behavior is normal. Thought content normal.   Nursing note and vitals reviewed.      Assessment:     1. Acute pharyngitis, unspecified etiology        Plan:     Patient Instructions   PLEASE READ YOUR DISCHARGE INSTRUCTIONS ENTIRELY AS IT CONTAINS IMPORTANT  INFORMATION.        Sore throat recommendations: Warm fluids, warm salt water gargles, throat lozenges, tea, honey, soup, rest, hydration.        Tylenol and ibuprofen    Swish and spit out the magic mouthwash, use just as needed for sore throat.    Please return or see your primary care doctor if you develop new or worsening symptoms.     Please arrange follow up with your primary medical clinic as soon as possible. You must understand that you've received an Urgent Care treatment only and that you may be released before all of your medical problems are known or treated. You, the patient, will arrange for follow up as instructed. If your symptoms worsen or fail to improve you should go to the Emergency Room.       Acute pharyngitis, unspecified etiology  -     predniSONE (DELTASONE) 20 MG tablet; Take 1 tablet (20 mg total) by mouth once daily.  Dispense: 5 tablet; Refill: 0

## 2023-12-14 ENCOUNTER — OFFICE VISIT (OUTPATIENT)
Dept: URGENT CARE | Facility: CLINIC | Age: 43
End: 2023-12-14
Payer: COMMERCIAL

## 2023-12-14 VITALS
TEMPERATURE: 99 F | WEIGHT: 253.31 LBS | BODY MASS INDEX: 30.85 KG/M2 | DIASTOLIC BLOOD PRESSURE: 91 MMHG | RESPIRATION RATE: 16 BRPM | HEART RATE: 99 BPM | HEIGHT: 76 IN | OXYGEN SATURATION: 99 % | SYSTOLIC BLOOD PRESSURE: 135 MMHG

## 2023-12-14 DIAGNOSIS — J02.9 SORE THROAT: ICD-10-CM

## 2023-12-14 DIAGNOSIS — J02.0 STREP PHARYNGITIS: Primary | ICD-10-CM

## 2023-12-14 LAB
CTP QC/QA: YES
MOLECULAR STREP A: POSITIVE

## 2023-12-14 PROCEDURE — 87651 STREP A DNA AMP PROBE: CPT | Mod: QW,S$GLB,,

## 2023-12-14 PROCEDURE — 87651 POCT STREP A MOLECULAR: ICD-10-PCS | Mod: QW,S$GLB,,

## 2023-12-14 PROCEDURE — 99213 PR OFFICE/OUTPT VISIT, EST, LEVL III, 20-29 MIN: ICD-10-PCS | Mod: S$GLB,,,

## 2023-12-14 PROCEDURE — 99213 OFFICE O/P EST LOW 20 MIN: CPT | Mod: S$GLB,,,

## 2023-12-14 RX ORDER — PENICILLIN V POTASSIUM 500 MG/1
500 TABLET, FILM COATED ORAL EVERY 12 HOURS
Qty: 20 TABLET | Refills: 0 | Status: SHIPPED | OUTPATIENT
Start: 2023-12-14 | End: 2023-12-24

## 2023-12-14 NOTE — PATIENT INSTRUCTIONS
You have tested positive for streptococcus pharyngitis today. Make sure to take antibiotics until completed!!  - You can take over the counter ibuprofen or tylenol for throat pain. You can also do warm salt water gargles to help soothe the throat.  - You are no longer considered contagious once you have had antibiotics in your system for 24 hours.   - Make sure to change out your tooth brush once you have been on antibiotics for over 24 hours to prevent reinfecting yourself. Wash any pillow cases you have slept on or masks that you have worn while infected.  - Do not eat or drink after anyone to prevent infecting someone else with strep throat.     - okay to continue prednisone.   - Start claritin or zyrtec for congestion.   - Start flonase for congestion and ear pressure.     - Rest.    - Drink plenty of fluids.    - Acetaminophen (tylenol) or Ibuprofen (advil,motrin) as directed as needed for fever/pain. Avoid tylenol if you have a history of liver disease. Do not take ibuprofen if you have a history of GI bleeding, kidney disease, or if you take blood thinners.   - Ibuprofen dosing for adults: 400 mg by mouth every 4-6 hours as needed. Max: 2400 mg/day; Info: use lowest effective dose, shortest effective treatment duration; give w/ food if GI upset occurs.  - Tylenol dosing for adults: [By mouth route, immediate-release form] Dose: 325-1000 mg by mouth every 4-6h as needed; Max: 1 g/4h and 4 g/day from all sources. [By mouth route, extended-release form] Dose: 650-1300 mg Extended Release by mouth every 8h as needed; Max: 4 g/day from all sources.     - You must understand that you have received an Urgent Care treatment only and that you may be released before all of your medical problems are known or treated.   - You, the patient, will arrange for follow up care as instructed.   - If your condition worsens or fails to improve we recommend that you receive another evaluation at the ER immediately or contact your  PCP to discuss your concerns or return here.   - Follow up with your PCP or specialty clinic as directed in the next 1-2 weeks if not improved or as needed.  You can call [phone] to schedule an appointment with the appropriate provider.    If your symptoms do not improve or worsen, go to the emergency room immediately.

## 2023-12-14 NOTE — PROGRESS NOTES
"Subjective:      Patient ID: Kurt Stoll is a 43 y.o. male.    Vitals:  height is 6' 4" (1.93 m) and weight is 114.9 kg (253 lb 4.9 oz). His oral temperature is 98.5 °F (36.9 °C). His blood pressure is 135/91 (abnormal) and his pulse is 99. His respiration is 16 and oxygen saturation is 99%.     Chief Complaint: Sore Throat    Pt presents sore throat, right ear pain, trouble swallowing, and fever. Pt had virtual visit yesterday and was rx predisone. Pt took at home covid test that was negative. There's more irritation on the right side. Sx began on Sunday morning with sore throat, fever began on Monday continuing to today.     Sore Throat   This is a new problem. The pain is worse on the right side. Associated symptoms include ear pain, swollen glands and trouble swallowing. He has tried acetaminophen (and prednisone) for the symptoms.       HENT:  Positive for ear pain, sore throat and trouble swallowing.       Objective:     Physical Exam   Constitutional: He is oriented to person, place, and time. He appears well-developed. He is cooperative.  Non-toxic appearance. He does not appear ill. No distress.      Comments:Patient sits comfortably in exam chair. Answers questions in complete sentences. Does not show any signs of distress or discoloration.        HENT:   Head: Normocephalic and atraumatic.   Ears:   Right Ear: Hearing, tympanic membrane, external ear and ear canal normal. impacted cerumen  Left Ear: Hearing, tympanic membrane, external ear and ear canal normal. impacted cerumen  Nose: Rhinorrhea and congestion present. No mucosal edema or nasal deformity. No epistaxis. Right sinus exhibits no maxillary sinus tenderness and no frontal sinus tenderness. Left sinus exhibits no maxillary sinus tenderness and no frontal sinus tenderness.   Mouth/Throat: Uvula is midline and mucous membranes are normal. No trismus in the jaw. Normal dentition. No uvula swelling. Posterior oropharyngeal erythema present. No " oropharyngeal exudate or posterior oropharyngeal edema. Tonsils are 3+ on the right. Tonsils are 3+ on the left. No tonsillar exudate.   Eyes: Conjunctivae and lids are normal. No scleral icterus.   Neck: Trachea normal and phonation normal. Neck supple. No edema present. No erythema present. No neck rigidity present.   Cardiovascular: Normal rate, regular rhythm, normal heart sounds and normal pulses.   Pulmonary/Chest: Effort normal and breath sounds normal. No stridor. No respiratory distress. He has no decreased breath sounds. He has no wheezes. He has no rhonchi. He has no rales.   Abdominal: Normal appearance.   Musculoskeletal: Normal range of motion.         General: No deformity. Normal range of motion.   Lymphadenopathy:     He has cervical adenopathy.        Right cervical: Superficial cervical adenopathy present. No deep cervical and no posterior cervical adenopathy present.       Left cervical: Superficial cervical adenopathy present. No deep cervical and no posterior cervical adenopathy present.   Neurological: He is alert and oriented to person, place, and time. He exhibits normal muscle tone. Coordination normal.   Skin: Skin is warm, dry, intact, not diaphoretic and not pale.   Psychiatric: His speech is normal and behavior is normal. Judgment and thought content normal.   Nursing note and vitals reviewed.    Results for orders placed or performed in visit on 12/14/23   POCT Strep A, Molecular   Result Value Ref Range    Molecular Strep A, POC Positive (A) Negative     Acceptable Yes        Assessment:     1. Strep pharyngitis    2. Sore throat        Plan:       Strep pharyngitis  -     penicillin v potassium (VEETID) 500 MG tablet; Take 1 tablet (500 mg total) by mouth every 12 (twelve) hours. for 10 days  Dispense: 20 tablet; Refill: 0    Sore throat  -     POCT Strep A, Molecular                Patient Instructions   You have tested positive for streptococcus pharyngitis today.  Make sure to take antibiotics until completed!!  - You can take over the counter ibuprofen or tylenol for throat pain. You can also do warm salt water gargles to help soothe the throat.  - You are no longer considered contagious once you have had antibiotics in your system for 24 hours.   - Make sure to change out your tooth brush once you have been on antibiotics for over 24 hours to prevent reinfecting yourself. Wash any pillow cases you have slept on or masks that you have worn while infected.  - Do not eat or drink after anyone to prevent infecting someone else with strep throat.     - okay to continue prednisone.   - Start claritin or zyrtec for congestion.   - Start flonase for congestion and ear pressure.     - Rest.    - Drink plenty of fluids.    - Acetaminophen (tylenol) or Ibuprofen (advil,motrin) as directed as needed for fever/pain. Avoid tylenol if you have a history of liver disease. Do not take ibuprofen if you have a history of GI bleeding, kidney disease, or if you take blood thinners.   - Ibuprofen dosing for adults: 400 mg by mouth every 4-6 hours as needed. Max: 2400 mg/day; Info: use lowest effective dose, shortest effective treatment duration; give w/ food if GI upset occurs.  - Tylenol dosing for adults: [By mouth route, immediate-release form] Dose: 325-1000 mg by mouth every 4-6h as needed; Max: 1 g/4h and 4 g/day from all sources. [By mouth route, extended-release form] Dose: 650-1300 mg Extended Release by mouth every 8h as needed; Max: 4 g/day from all sources.     - You must understand that you have received an Urgent Care treatment only and that you may be released before all of your medical problems are known or treated.   - You, the patient, will arrange for follow up care as instructed.   - If your condition worsens or fails to improve we recommend that you receive another evaluation at the ER immediately or contact your PCP to discuss your concerns or return here.   - Follow up  with your PCP or specialty clinic as directed in the next 1-2 weeks if not improved or as needed.  You can call [phone] to schedule an appointment with the appropriate provider.    If your symptoms do not improve or worsen, go to the emergency room immediately.

## 2024-03-28 ENCOUNTER — E-VISIT (OUTPATIENT)
Dept: FAMILY MEDICINE | Facility: CLINIC | Age: 44
End: 2024-03-28
Payer: COMMERCIAL

## 2024-03-28 DIAGNOSIS — H57.10 PAIN IN EYE, UNSPECIFIED LATERALITY: ICD-10-CM

## 2024-03-28 DIAGNOSIS — H01.009 BLEPHARITIS, UNSPECIFIED LATERALITY, UNSPECIFIED TYPE: Primary | ICD-10-CM

## 2024-03-28 PROCEDURE — 99422 OL DIG E/M SVC 11-20 MIN: CPT | Mod: ,,, | Performed by: STUDENT IN AN ORGANIZED HEALTH CARE EDUCATION/TRAINING PROGRAM

## 2024-03-28 RX ORDER — NAPROXEN 500 MG/1
500 TABLET ORAL 2 TIMES DAILY PRN
Qty: 14 TABLET | Refills: 0 | Status: SHIPPED | OUTPATIENT
Start: 2024-03-28 | End: 2024-04-04

## 2024-03-28 RX ORDER — ERYTHROMYCIN 5 MG/G
OINTMENT OPHTHALMIC NIGHTLY
Qty: 3.5 G | Refills: 1 | Status: SHIPPED | OUTPATIENT
Start: 2024-03-28 | End: 2024-04-04

## 2024-03-28 NOTE — PROGRESS NOTES
Patient ID: Kurt Stoll is a 43 y.o. male.    Chief Complaint: Conjunctivitis (Entered automatically based on patient selection in Patient Portal.)          274}  The patient initiated a request through [facility] on 3/28/2024 for evaluation and management with a chief complaint of Conjunctivitis (Entered automatically based on patient selection in Patient Portal.)     I evaluated the questionnaire submission on 03/28/2024 .    Total Time (in minutes): 12     Ohs Peq Evisit Red Eye    3/28/2024  9:15 AM CDT - Filed by Patient   Do you agree to participate in an E-Visit? Yes   If you have any of the following symptoms, please present to your local ER or call 911:  I acknowledge   What is the main issue you would like addressed today? Swollen, red, and painful eyelid   Are you able to take your vital signs? No   Which of the following have you been experiencing? None of the above   Have you had any of the following? None of the above    How long have you been having these symptoms? For a few days   Do you have a fever? No, I do not have a fever   Are your symptoms associated with swimming? No, I have not been swimming recently   Have your eyes been exposed to any chemicals, creams, or drops that may be causing irritation? No   Have you suffered any recent injury to your eyes? No   Have you been exposed to anyone with similar symptoms? No   Did or do you wear contact lenses? No   Have you had any of the following? None of the above   Have you had any of the following in the past? I have not had any past problems with my eyes.   What medications are you currently using for these symptoms? Allergy pills   Please enter the medications you have been using: Allegra   Provide any additional information you feel is important.    At least one photo is required for treatment to be provided. You can upload a maximum of three photos of the affected area.            Active Problem List with Overview Notes    Diagnosis Date Noted     Weakness 02/07/2022    Muscle twitch 12/12/2017    Elevated CPK 12/12/2017     -Evaluated in Neurology in 2017 and again 2/7/2022, with EMG/NCS (no myopathy) and labs 3/28/2022. No diagnosis made.        Recent Labs Obtained:  Lab Results   Component Value Date    WBC 4.74 08/25/2021    HGB 15.1 08/25/2021    HCT 43.7 08/25/2021    MCV 92 08/25/2021     08/25/2021     09/11/2023    K 4.2 09/11/2023    GLU 91 09/11/2023    CREATININE 1.2 09/11/2023    EGFRNORACEVR >60.0 09/11/2023    HGBA1C 5.3 09/11/2023      Review of patient's allergies indicates:  No Known Allergies    Encounter Diagnoses   Name Primary?    Blepharitis, unspecified laterality, unspecified type Yes    Pain in eye, unspecified laterality         No orders of the defined types were placed in this encounter.     Medications Ordered This Encounter   Medications    erythromycin (ROMYCIN) ophthalmic ointment     Sig: Place into both eyes every evening. for 7 days     Dispense:  3.5 g     Refill:  1    naproxen (NAPROSYN) 500 MG tablet     Sig: Take 1 tablet (500 mg total) by mouth 2 (two) times daily as needed (pain).     Dispense:  14 tablet     Refill:  0        E-Visit Time Tracking:    Day 1 Time (in minutes): 12    Total Time (in minutes): 12      274}

## 2024-06-10 ENCOUNTER — PATIENT MESSAGE (OUTPATIENT)
Dept: INTERNAL MEDICINE | Facility: CLINIC | Age: 44
End: 2024-06-10
Payer: COMMERCIAL

## 2024-08-16 ENCOUNTER — LAB VISIT (OUTPATIENT)
Dept: LAB | Facility: HOSPITAL | Age: 44
End: 2024-08-16
Payer: COMMERCIAL

## 2024-08-16 ENCOUNTER — OFFICE VISIT (OUTPATIENT)
Dept: INTERNAL MEDICINE | Facility: CLINIC | Age: 44
End: 2024-08-16
Payer: COMMERCIAL

## 2024-08-16 VITALS
SYSTOLIC BLOOD PRESSURE: 138 MMHG | DIASTOLIC BLOOD PRESSURE: 88 MMHG | BODY MASS INDEX: 29.76 KG/M2 | HEART RATE: 63 BPM | HEIGHT: 76 IN | WEIGHT: 244.38 LBS | TEMPERATURE: 99 F | RESPIRATION RATE: 18 BRPM | OXYGEN SATURATION: 100 %

## 2024-08-16 DIAGNOSIS — Z01.89 ROUTINE LAB DRAW: ICD-10-CM

## 2024-08-16 DIAGNOSIS — Z13.220 SCREENING CHOLESTEROL LEVEL: ICD-10-CM

## 2024-08-16 DIAGNOSIS — Z13.1 DIABETES MELLITUS SCREENING: ICD-10-CM

## 2024-08-16 DIAGNOSIS — Z00.8 ENCOUNTER FOR BIOMETRIC SCREENING: Primary | ICD-10-CM

## 2024-08-16 DIAGNOSIS — E66.3 OVERWEIGHT (BMI 25.0-29.9): ICD-10-CM

## 2024-08-16 LAB
CHOLEST SERPL-MCNC: 156 MG/DL (ref 120–199)
CHOLEST/HDLC SERPL: 3.2 {RATIO} (ref 2–5)
ESTIMATED AVG GLUCOSE: 105 MG/DL (ref 68–131)
GLUCOSE SERPL-MCNC: 95 MG/DL (ref 70–110)
HBA1C MFR BLD: 5.3 % (ref 4–5.6)
HDLC SERPL-MCNC: 49 MG/DL (ref 40–75)
HDLC SERPL: 31.4 % (ref 20–50)
LDLC SERPL CALC-MCNC: 96.4 MG/DL (ref 63–159)
NONHDLC SERPL-MCNC: 107 MG/DL
TRIGL SERPL-MCNC: 53 MG/DL (ref 30–150)

## 2024-08-16 PROCEDURE — 83036 HEMOGLOBIN GLYCOSYLATED A1C: CPT | Performed by: NURSE PRACTITIONER

## 2024-08-16 PROCEDURE — 99999 PR PBB SHADOW E&M-EST. PATIENT-LVL IV: CPT | Mod: PBBFAC,,, | Performed by: NURSE PRACTITIONER

## 2024-08-16 PROCEDURE — 82947 ASSAY GLUCOSE BLOOD QUANT: CPT | Performed by: NURSE PRACTITIONER

## 2024-08-16 PROCEDURE — 80061 LIPID PANEL: CPT | Performed by: NURSE PRACTITIONER

## 2024-08-16 PROCEDURE — 36415 COLL VENOUS BLD VENIPUNCTURE: CPT | Performed by: NURSE PRACTITIONER

## 2024-08-16 NOTE — PROGRESS NOTES
"Subjective     Patient ID: Kurt Stoll is a 43 y.o. male.    Chief Complaint: Annual Exam    Pt of Dr. Yun, here for, "Fill out my medical insurance discount form."    Here for biometic screening form. He is fasting    I have reviewed the HPI/ROS info pt entered in portal prior to visit today below     Review of Systems   Constitutional:  Negative for activity change, appetite change and unexpected weight change.   HENT:  Negative for hearing loss, rhinorrhea, trouble swallowing and voice change.    Eyes:  Negative for discharge and visual disturbance.   Respiratory:  Negative for apnea, cough, chest tightness, shortness of breath and wheezing.    Cardiovascular:  Negative for chest pain, palpitations and leg swelling.   Gastrointestinal:  Negative for abdominal distention, abdominal pain, blood in stool, constipation, diarrhea, nausea and vomiting.   Endocrine: Negative for cold intolerance, heat intolerance, polydipsia, polyphagia and polyuria.   Genitourinary:  Negative for difficulty urinating, dysuria, hematuria, penile pain and urgency.   Musculoskeletal:  Negative for arthralgias, joint swelling, myalgias and neck pain.   Integumentary:  Negative for color change, pallor, rash and wound.   Allergic/Immunologic: Negative for environmental allergies and food allergies.   Neurological:  Negative for dizziness, weakness, light-headedness, numbness and headaches.   Hematological:  Negative for adenopathy. Does not bruise/bleed easily.   Psychiatric/Behavioral:  Negative for agitation, behavioral problems, confusion, dysphoric mood, sleep disturbance and suicidal ideas.      Review of patient's allergies indicates:  No Known Allergies    No current outpatient medications on file.    Patient Active Problem List   Diagnosis    Muscle twitch    Elevated CPK    Weakness     No past medical history on file.    Past Surgical History:   Procedure Laterality Date    TYMPANOSTOMY TUBE PLACEMENT         Social History "     Socioeconomic History    Marital status:     Number of children: 1.5   Occupational History    Occupation:      Employer: [employer]     Comment: [employer]   Tobacco Use    Smoking status: Former     Current packs/day: 0.00     Average packs/day: 1 pack/day for 9.0 years (9.0 ttl pk-yrs)     Types: Cigarettes     Start date: 2000     Quit date: 2009     Years since quittin.7    Smokeless tobacco: Never   Substance and Sexual Activity    Alcohol use: Yes     Comment: 17 drinks/weekend:  wine and liquor.     Social Determinants of Health     Financial Resource Strain: Low Risk  (2024)    Overall Financial Resource Strain (CARDIA)     Difficulty of Paying Living Expenses: Not hard at all   Food Insecurity: No Food Insecurity (2024)    Hunger Vital Sign     Worried About Running Out of Food in the Last Year: Never true     Ran Out of Food in the Last Year: Never true   Transportation Needs: No Transportation Needs (2023)    PRAPARE - Transportation     Lack of Transportation (Medical): No     Lack of Transportation (Non-Medical): No   Physical Activity: Sufficiently Active (2024)    Exercise Vital Sign     Days of Exercise per Week: 5 days     Minutes of Exercise per Session: 60 min   Stress: Stress Concern Present (2024)    [employer] [place] [employer] - Occupational Stress Questionnaire     Feeling of Stress : To some extent   Housing Stability: Low Risk  (2023)    Housing Stability Vital Sign     Unable to Pay for Housing in the Last Year: No     Number of Places Lived in the Last Year: 1     Unstable Housing in the Last Year: No       Family History   Problem Relation Name Age of Onset    Aneurysm Mother      No Known Problems Father      Heart disease Paternal Grandfather      No Known Problems Sister      No Known Problems Brother      Aneurysm Maternal Aunt      Aneurysm Maternal Uncle         Objective  "    Vitals:    08/16/24 1037   BP: 138/88   Pulse: 63   Resp: 18   Temp: 98.7 °F (37.1 °C)   TempSrc: Oral   SpO2: 100%   Weight: 110.9 kg (244 lb 6.1 oz)   Height: 6' 4" (1.93 m)   PainSc: 0-No pain     Body mass index is 29.75 kg/m².    Physical Exam  Vitals and nursing note reviewed.   Constitutional:       Appearance: Normal appearance. He is well-developed.   HENT:      Head: Normocephalic.      Nose: Nose normal.      Mouth/Throat:      Mouth: Mucous membranes are moist.   Eyes:      General: Lids are normal. Lids are everted, no foreign bodies appreciated.      Conjunctiva/sclera: Conjunctivae normal.   Neck:      Vascular: No carotid bruit or JVD.      Trachea: Trachea normal.   Cardiovascular:      Rate and Rhythm: Normal rate and regular rhythm.      Pulses: Normal pulses.      Heart sounds: Normal heart sounds.   Pulmonary:      Effort: Pulmonary effort is normal.      Breath sounds: Normal breath sounds.   Abdominal:      General: Bowel sounds are normal.   Musculoskeletal:         General: Normal range of motion.      Cervical back: Full passive range of motion without pain, normal range of motion and neck supple.   Skin:     General: Skin is warm and dry.      Capillary Refill: Capillary refill takes less than 2 seconds.   Neurological:      General: No focal deficit present.      Mental Status: He is alert and oriented to person, place, and time.   Psychiatric:         Mood and Affect: Mood normal.         Speech: Speech normal.         Behavior: Behavior normal.         Thought Content: Thought content normal.         Judgment: Judgment normal.            Assessment and Plan     1. Encounter for biometric screening  Biometric screening form filled out    Check labs, will message with results    2. Routine lab draw  -     Lipid Panel; Future; Expected date: 08/16/2024  -     GLUCOSE, RANDOM; Future; Expected date: 08/16/2024  -     Hemoglobin A1C; Future; Expected date: 08/16/2024    3. Screening " cholesterol level  -     Lipid Panel; Future; Expected date: 08/16/2024    4. Diabetes mellitus screening  -     GLUCOSE, RANDOM; Future; Expected date: 08/16/2024  -     Hemoglobin A1C; Future; Expected date: 08/16/2024    5. BMI 29.0-29.9,adult  6. Overweight (BMI 25.0-29.9)  BMI reviewed.    Diet and exercise to lose weight.             Follow up in about 1 year (around 8/16/2025), or if symptoms worsen or fail to improve, for annual or sooner as needed.

## 2025-08-20 ENCOUNTER — TELEPHONE (OUTPATIENT)
Dept: INTERNAL MEDICINE | Facility: CLINIC | Age: 45
End: 2025-08-20
Payer: COMMERCIAL

## 2025-08-21 ENCOUNTER — HOSPITAL ENCOUNTER (OUTPATIENT)
Dept: RADIOLOGY | Facility: HOSPITAL | Age: 45
Discharge: HOME | End: 2025-08-21
Attending: FAMILY MEDICINE
Payer: COMMERCIAL

## 2025-08-21 ENCOUNTER — OFFICE VISIT (OUTPATIENT)
Dept: INTERNAL MEDICINE | Facility: CLINIC | Age: 45
End: 2025-08-21
Attending: FAMILY MEDICINE
Payer: COMMERCIAL

## 2025-08-21 VITALS
SYSTOLIC BLOOD PRESSURE: 118 MMHG | HEART RATE: 69 BPM | DIASTOLIC BLOOD PRESSURE: 82 MMHG | HEIGHT: 76 IN | WEIGHT: 244.94 LBS | OXYGEN SATURATION: 96 % | BODY MASS INDEX: 29.83 KG/M2

## 2025-08-21 DIAGNOSIS — Z00.00 ANNUAL PHYSICAL EXAM: Primary | ICD-10-CM

## 2025-08-21 DIAGNOSIS — Z30.09 VASECTOMY EVALUATION: ICD-10-CM

## 2025-08-21 DIAGNOSIS — R74.8 ELEVATED CPK: ICD-10-CM

## 2025-08-21 DIAGNOSIS — M19.079 ARTHRITIS OF FIRST MTP JOINT: ICD-10-CM

## 2025-08-21 PROCEDURE — 73630 X-RAY EXAM OF FOOT: CPT | Mod: TC,LT

## 2025-08-21 PROCEDURE — 73630 X-RAY EXAM OF FOOT: CPT | Mod: 26,LT,, | Performed by: RADIOLOGY

## 2025-08-21 PROCEDURE — 99999 PR PBB SHADOW E&M-EST. PATIENT-LVL V: CPT | Mod: PBBFAC,,, | Performed by: FAMILY MEDICINE

## 2025-08-29 ENCOUNTER — PATIENT MESSAGE (OUTPATIENT)
Dept: UROLOGY | Facility: CLINIC | Age: 45
End: 2025-08-29

## 2025-08-29 ENCOUNTER — OFFICE VISIT (OUTPATIENT)
Dept: UROLOGY | Facility: CLINIC | Age: 45
End: 2025-08-29
Attending: FAMILY MEDICINE
Payer: COMMERCIAL

## 2025-08-29 VITALS
HEART RATE: 62 BPM | SYSTOLIC BLOOD PRESSURE: 120 MMHG | HEIGHT: 76 IN | BODY MASS INDEX: 29.83 KG/M2 | DIASTOLIC BLOOD PRESSURE: 79 MMHG | WEIGHT: 244.94 LBS

## 2025-08-29 DIAGNOSIS — Z30.09 VASECTOMY EVALUATION: Primary | ICD-10-CM

## 2025-08-29 PROCEDURE — 99999 PR PBB SHADOW E&M-EST. PATIENT-LVL III: CPT | Mod: PBBFAC,,, | Performed by: UROLOGY

## 2025-08-29 RX ORDER — DIAZEPAM 5 MG/1
5 TABLET ORAL ONCE
Qty: 3 TABLET | Refills: 0 | Status: SHIPPED | OUTPATIENT
Start: 2025-08-29 | End: 2025-08-29

## 2025-08-29 RX ORDER — HYDROCODONE BITARTRATE AND ACETAMINOPHEN 5; 325 MG/1; MG/1
1 TABLET ORAL EVERY 6 HOURS PRN
Qty: 7 TABLET | Refills: 0 | Status: SHIPPED | OUTPATIENT
Start: 2025-08-29 | End: 2025-09-03